# Patient Record
Sex: FEMALE | Race: OTHER | Employment: FULL TIME | URBAN - METROPOLITAN AREA
[De-identification: names, ages, dates, MRNs, and addresses within clinical notes are randomized per-mention and may not be internally consistent; named-entity substitution may affect disease eponyms.]

---

## 2017-01-31 ENCOUNTER — TELEPHONE (OUTPATIENT)
Dept: INTERNAL MEDICINE CLINIC | Age: 52
End: 2017-01-31

## 2017-01-31 ENCOUNTER — OFFICE VISIT (OUTPATIENT)
Dept: INTERNAL MEDICINE CLINIC | Age: 52
End: 2017-01-31

## 2017-01-31 VITALS
HEIGHT: 60 IN | SYSTOLIC BLOOD PRESSURE: 108 MMHG | OXYGEN SATURATION: 99 % | DIASTOLIC BLOOD PRESSURE: 74 MMHG | HEART RATE: 64 BPM | RESPIRATION RATE: 19 BRPM | BODY MASS INDEX: 30.27 KG/M2 | WEIGHT: 154.2 LBS | TEMPERATURE: 98.2 F

## 2017-01-31 DIAGNOSIS — N95.1 MENOPAUSAL SYMPTOMS: ICD-10-CM

## 2017-01-31 DIAGNOSIS — E11.9 NEW ONSET TYPE 2 DIABETES MELLITUS (HCC): Primary | ICD-10-CM

## 2017-01-31 DIAGNOSIS — N89.8 VAGINA ITCHING: ICD-10-CM

## 2017-01-31 DIAGNOSIS — N92.6 IRREGULAR MENSES: ICD-10-CM

## 2017-01-31 RX ORDER — FLUCONAZOLE 150 MG/1
150 TABLET ORAL
Qty: 2 TAB | Refills: 0 | Status: SHIPPED | OUTPATIENT
Start: 2017-01-31 | End: 2017-02-08

## 2017-01-31 NOTE — MR AVS SNAPSHOT
Visit Information Beck Roberts Personal Médico Departamento Teléfono del Dep. Número de visita 1/31/2017 10:15 AM Doug Oh MD [facility] Sports [facility] and [address] [phone] Follow-up Instructions Return in about 3 weeks (around 2/24/2017), or if symptoms worsen or fail to improve, for Diabetes, , Review Lab Results. Follow-up and Disposition History Upcoming Health Maintenance Date Due HEMOGLOBIN A1C Q6M 1966 FOOT EXAM Q1 1/19/1976 MICROALBUMIN Q1 1/19/1976 EYE EXAM RETINAL OR DILATED Q1 1/19/1976 Pneumococcal 19-64 Medium Risk (1 of 1 - PPSV23) 1/19/1985 DTaP/Tdap/Td series (1 - Tdap) 1/19/1987 FOBT Q 1 YEAR AGE 50-75 1/19/2016 INFLUENZA AGE 9 TO ADULT 8/1/2016 LIPID PANEL Q1 9/14/2016 BREAST CANCER SCRN MAMMOGRAM 10/21/2017 PAP AKA CERVICAL CYTOLOGY 9/14/2018 Alergias  Review Complete El: 1/31/2017 Por: [place] A partir del:  1/31/2017 No Known Allergies Vacunas actuales Coit Hirschfeld No hay ninguna vacuna archivada. No revisadas esta visita You Were Diagnosed With   
  
 Mammie Romberg New onset type 2 diabetes mellitus ([facility] [address]    -  Primary ICD-10-CM: E11.9 ICD-9-CM: 250.00 Menopausal symptoms     ICD-10-CM: N95.1 ICD-9-CM: 627.2 Irregular menses     ICD-10-CM: N92.6 ICD-9-CM: 626.4 Vagina itching     ICD-10-CM: L29.8 ICD-9-CM: 698.1 Partes vitales PS Pulso Temperatura Resp [place] ( percentil de crecimiento) Peso (percentil de crecimiento) 108/74 (BP 1 Location: Left arm, BP Patient Position: Sitting) 64 98.2 °F (36.8 °C) (Oral) 19 5' 0.3\" (1.532 m) 154 lb 3.2 oz (69.9 kg) SpO2 BMI (IMC) Estado obstétrico Estatus de tabaquísmo 99% 29.82 kg/m2 Having regular periods Never Smoker BMI and BSA Data Body Mass Index Body Surface Area  
 29.82 kg/m 2 1.72 m 2 [facility] Name Phone Rebecca Kumar [address] [phone] Brown lista de medicamentos actualizada Lista actualizada el: 17 11:36 AM.  Radha Engsam use brown lista de medicamentos más reciente. cyclobenzaprine 5 mg tablet También conocido roselyn:  FLEXERIL Take 1-2 tablets at night for back pain. Muscle relaxer  
  
 diclofenac EC 75 mg EC tablet También conocido roselyn:  VOLTAREN  
75 mg. Diphth, Pertus(Acell), Tetanus 2.5-8-5 Lf-mcg-Lf/0.5mL Syrg También conocido roselyn:  BOOSTRIX TDAP  
0.5 mL by IntraMUSCular route once for 1 dose. fluconazole 150 mg tablet También conocido roselyn:  DIFLUCAN Take 1 Tab by mouth every seven (7) days for 2 doses. FDA advises cautious prescribing of oral fluconazole in pregnancy. metFORMIN 500 mg tablet También conocido roselyn:  GLUCOPHAGE Take  by mouth two (2) times daily (with meals). PARoxetine 40 mg tablet También conocido roselyn:  PAXIL Take 1 Tab by mouth daily. pneumococcal 13 jimmy conj dip 0.5 mL Syrg injection También conocido roselyn:  PREVNAR-13  
0.5 mL by IntraMUSCular route once for 1 dose. Recetas Enviado a la [place] Refills  
 pneumococcal 13 jimmy conj dip (PREVNAR-13) 0.5 mL syrg injection 0 Si.5 mL by IntraMUSCular route once for 1 dose. Class: Normal  
 Pharmacy: Rebecca Kumar [place] [id number] [facility] Ph #: [phone] Route: IntraMUSCular Dewanda Lent,, Tetanus (BOOSTRIX TDAP) 2.5-8-5 Lf-mcg-Lf/0.5mL syrg 0 Si.5 mL by IntraMUSCular route once for 1 dose. Class: Normal  
 Pharmacy: Rebecca Kumar [facility] [address] [facility] Ph #: [phone] Route: IntraMUSCular  
 fluconazole (DIFLUCAN) 150 mg tablet 0 Sig: Take 1 Tab by mouth every seven (7) days for 2 doses. FDA advises cautious prescribing of oral fluconazole in pregnancy.   
 Class: Normal  
 Pharmacy: Rachid Crane [address], [address] [employer]  #: [phone] Route: Oral  
  
Hicimos lo siguiente HEMOGLOBIN A1C WITH EAG [26885 CPT(R)]  DIABETES FOOT EXAM [HM7 Custom] LIPID PANEL [68167 CPT(R)] MICROALBUMIN, UR, RAND W/ MICROALBUMIN/CREA RATIO [id number] CPT(R)] OCCULT BLOOD, IMMUNOASSAY (FIT) [id number] CPT(R)] REFERRAL TO OBSTETRICS AND GYNECOLOGY [REF51 Custom] Comentarios:  
 Please evaluate patient for endometrial biopsy- no menses x 6 months, then restarted. REFERRAL TO OPHTHALMOLOGY [REF57 Custom] Instrucciones de seguimiento Return in about 3 weeks (around 2/24/2017), or if symptoms worsen or fail to improve, for Diabetes, , Review Lab Results. Informacion de [employer] Codigo de Referencia Referido por Referido a  
  
 [id number] JOVANNI HARVEY No disponible Visitas [place] [facility] final  
 1 New Request 1/31/17 1/31/18 Si brown referencia tiene un estado de \"pending review\" o \"denied\" , informacion adicional sera enviada para apoyar el resultado de esta decision. Codigo de Referencia Referido por Referido a  
 [id number] [place], [address] [facility], [address] [facility], [address] Phone: [phone] Fax: [phone] [facility]  
 1 New Request 1/31/17 1/31/18 Si brown referencia tiene un estado de \"pending review\" o \"denied\" , informacion adicional sera enviada para apoyar el resultado de esta decision. Introducing [facility] SERVICES! [facility] introduce portal paciente MyChart . Ahora se puede acceder a partes de brown expediente médico, enviar por correo electrónico la oficina de brown médico y solicitar renovaciones de medicamentos en línea. En brown navegador de Internet , Margaret arias [email or web address] Paramjit clic en el usuario por Chay Esqueda?  Mannie Short clic aquí en la Adela Lay. Verá la página de registro [place]. Ingrese brown código de [employer] kirk y roselyn aparece a continuación. Usted no tendrá que [employer] código después de avis completado el proceso de registro . Si usted no se inscribe antes de la fecha de caducidad , debe solicitar un nuevo código. · MyChart Código de acceso : [id number] Expires: 5/1/2017 11:36 AM 
 
Ingresa los últimos cuatro dígitos de brown Número de Seguro Social ( xxxx ) y fecha de nacimiento ( dd / mm / aaaa ) roselyn se indica y paramjit clic en Enviar. Usted será llevado a la siguiente página de registro . Crear un ID MyChart . Esta será brown ID de inicio de sesión de MyChart y no puede ser [place] , por lo que pensar en charles que es [place] y fácil de recordar . Crear charles contraseña MyChart . Usted puede cambiar brown contraseña en cualquier momento . Ingrese brown Password Reset de preguntas y [place] . Bode se puede utilizar en un momento posterior si usted olvida brown contraseña. Introduzca brown dirección de correo electrónico . Padmini Puente recibirá charles notificación por correo electrónico cuando la nueva información está disponible en MyChart . Beau Gayer clic en Registrarse. Karthik Mendoza anita y descargar porciones de brown expediente médico. 
Paramjit clic en el enlace de descarga del menú Resumen para descargar charles copia portátil de brown información médica . Si tiene [employer] , por favor visite la sección de preguntas frecuentes del sitio web MyChart . Recuerde, MyChart NO es que se utilizará para las necesidades urgentes. Para emergencias médicas , llame al 911 . Ahora disponible en brown iPhone y Android ! Por favor proporcione trey resumen de la documentación de cuidado a brown próximo proveedor. Your primary care clinician is listed as Manpreet Pi. If you have any questions after today's visit, please call [phone].

## 2017-01-31 NOTE — PROGRESS NOTES
1. Have you been to the ER, urgent care clinic since your last visit? Hospitalized since your last visit? No    2. Have you seen or consulted any other health care providers outside of the [address] [place] [address] since your last visit? Include any pap smears or colon screening.  No

## 2017-01-31 NOTE — PROGRESS NOTES
Ms. Claire Valdovinos is a 47y.o. year old female who had concerns including Vaginal Itching. HPI:  Chief Complaint   Patient presents with    Vaginal Itching     for 1 week , vaginal bleeding, menses after no menses x 6 months. No results found for: HBA1C, HGBE8, KCB8IYZZ, GHW2LGOV, NGG6EQWY    Pt does not check blood sugars. Wt Readings from Last 3 Encounters:   01/31/17 154 lb 3.2 oz (69.9 kg)   10/12/16 155 lb 4.8 oz (70.4 kg)   09/28/15 159 lb 14.4 oz (72.5 kg)       Past Medical History   Diagnosis Date    H/O mammogram 2012     normal    Pap smear for cervical cancer screening 4/2013     normal     Current Outpatient Prescriptions   Medication Sig Dispense    pneumococcal 13 jimmy conj dip (PREVNAR-13) 0.5 mL syrg injection 0.5 mL by IntraMUSCular route once for 1 dose. 0.5 mL    Diphth, Pertus,Acell,, Tetanus (BOOSTRIX TDAP) 2.5-8-5 Lf-mcg-Lf/0.5mL syrg 0.5 mL by IntraMUSCular route once for 1 dose. 0.5 mL    fluconazole (DIFLUCAN) 150 mg tablet Take 1 Tab by mouth every seven (7) days for 2 doses. FDA advises cautious prescribing of oral fluconazole in pregnancy. 2 Tab    metFORMIN (GLUCOPHAGE) 500 mg tablet Take  by mouth two (2) times daily (with meals).  PARoxetine (PAXIL) 40 mg tablet Take 1 Tab by mouth daily. 60 Tab    diclofenac EC (VOLTAREN) 75 mg EC tablet 75 mg.  cyclobenzaprine (FLEXERIL) 5 mg tablet Take 1-2 tablets at night for back pain. Muscle relaxer 15 Tab     No current facility-administered medications for this visit. Reviewed PmHx, RxHx, FmHx, SocHx, AllgHx and updated and dated in the chart.     ROS: Negative except for BOLD  General: fever, chills, fatigue  Respiratory: cough, SOB, wheezing  Cardiovascular:  CP, palpitation, CANTU, edema   Gastrointestinal: N/V/D, bleeding  Genito-Urinary: dysuria, hematuria  Musculoskeletal: muscle weakness, pain, swelling    OBJECTIVE:   Visit Vitals    /74 (BP 1 Location: Left arm, BP Patient Position: Sitting)    Pulse 64    Temp 98.2 °F (36.8 °C) (Oral)    Resp 19    Ht 5' 0.3\" (1.532 m)    Wt 154 lb 3.2 oz (69.9 kg)    SpO2 99%    BMI 29.82 kg/m2     GEN: The patient appears well, alert, oriented x 3, in no distress. ENT: bilateral TM and canal normal.  Neck supple. No adenopathy or thyromegaly. JUAN. Lungs: clear bilaterally, good air entry, no wheezes, rhonchi or rales. Cardiovascular: regular rate and rhythm. S1 and S2 normal, no murmurs,  Abdomen: + BS, soft without tenderness, guarding, rebound, mass or organomegaly. Extremities: no edema, normal peripheral pulses. Neurological: normal, gross sensory and motor in tact without focal findings. Pelvic exam: CERVIX: cervical discharge present - bloody. Erythema on labia majora. No CMTor enlarge uterus appeciated. Assessment/ Plan:       ICD-10-CM ICD-9-CM    1. [facility] E11.9 250.00 HEMOGLOBIN A1C WITH EAG      MICROALBUMIN, UR, RAND W/ MICROALBUMIN/CREA RATIO      REFERRAL TO OPHTHALMOLOGY      pneumococcal 13 jimmy conj dip (PREVNAR-13) 0.5 mL syrg injection      Diphth, Pertus,Acell,, Tetanus (BOOSTRIX TDAP) 2.5-8-5 Lf-mcg-Lf/0.5mL syrg      OCCULT BLOOD, IMMUNOASSAY (FIT)      LIPID PANEL      HM DIABETES FOOT EXAM   2. Menopausal symptoms N95.1 627.2    3. Irregular menses N92.6 626.4 REFERRAL TO OBSTETRICS AND GYNECOLOGY   4. Vagina itching L29.8 698.1 fluconazole (DIFLUCAN) 150 mg tablet     Pt needs to be retaught to check BS at home. I have discussed the diagnosis with the patient and the intended plan as seen in the above orders. The patient has received an after-visit summary and questions were answered concerning future plans. Medication Side Effects and Warnings were discussed with patient. Follow-up Disposition:  Return in about 3 weeks (around 2/24/2017), or if symptoms worsen or fail to improve, for Diabetes, , Review Lab Results.       Nicole Estevez MD

## 2017-02-01 ENCOUNTER — TELEPHONE (OUTPATIENT)
Dept: INTERNAL MEDICINE CLINIC | Age: 52
End: 2017-02-01

## 2017-02-01 NOTE — TELEPHONE ENCOUNTER
----- Message from Lyn Sánchez sent at 2/1/2017  3:13 PM EST -----  Regarding: Dr. Jamal Smith  Patient's daughter called for second time to clarify what type of lab the patient is supposed to submit. Patient was given a fecal kit to take home but thought she was supposed to submit a urine sample. Patient needs to be advised on the purpose of the lab that is being submitted. Best contact number is [phone]. Please speak with daughter, Kim Alvarez.

## 2017-02-02 ENCOUNTER — TELEPHONE (OUTPATIENT)
Dept: INTERNAL MEDICINE CLINIC | Age: 52
End: 2017-02-02

## 2017-02-02 LAB
ALBUMIN/CREAT UR: 35 MG/G CREAT (ref 0–30)
CHOLEST SERPL-MCNC: 177 MG/DL (ref 100–199)
CREAT UR-MCNC: 174.1 MG/DL
EST. AVERAGE GLUCOSE BLD GHB EST-MCNC: 126 MG/DL
HBA1C MFR BLD: 6 % (ref 4.8–5.6)
HDLC SERPL-MCNC: 62 MG/DL
HEMOCCULT STL QL IA: NORMAL
LDLC SERPL CALC-MCNC: 103 MG/DL (ref 0–99)
MICROALBUMIN UR-MCNC: 60.9 UG/ML
TRIGL SERPL-MCNC: 61 MG/DL (ref 0–149)
VLDLC SERPL CALC-MCNC: 12 MG/DL (ref 5–40)

## 2017-02-02 NOTE — TELEPHONE ENCOUNTER
Returned pt daughter call, pt is on release of info form and verified pt , in regards to kit that pt was given. Confirmed with daughter that kit is for stool sample to test risk for colorectal cancer by looking for hidden blood. Pt also asked why they had a referral to opthamology, viewed referral and informed her since her mother has dm it is important to have the eyes checked frequently, she verbalized understanding, no other questions at this time.

## 2017-02-09 ENCOUNTER — TELEPHONE (OUTPATIENT)
Dept: INTERNAL MEDICINE CLINIC | Age: 52
End: 2017-02-09

## 2017-02-09 DIAGNOSIS — N89.8 VAGINAL PRURITUS: Primary | ICD-10-CM

## 2017-02-09 NOTE — TELEPHONE ENCOUNTER
Pt came to office stating the 2 diflucan tablets she was given didn't help her itching. She stated  told her she would be gone but to come back if it didn't work and another would write something. Spoke with dr Megan Osei and we can refer to ob/gyn.  Pt notified

## 2017-02-14 LAB — HEMOCCULT STL QL IA: NEGATIVE

## 2017-02-27 ENCOUNTER — TELEPHONE (OUTPATIENT)
Dept: INTERNAL MEDICINE CLINIC | Age: 52
End: 2017-02-27

## 2017-02-27 ENCOUNTER — OFFICE VISIT (OUTPATIENT)
Dept: INTERNAL MEDICINE CLINIC | Age: 52
End: 2017-02-27

## 2017-02-27 VITALS
HEIGHT: 60 IN | OXYGEN SATURATION: 98 % | RESPIRATION RATE: 14 BRPM | TEMPERATURE: 98.8 F | BODY MASS INDEX: 30.23 KG/M2 | SYSTOLIC BLOOD PRESSURE: 111 MMHG | WEIGHT: 154 LBS | HEART RATE: 68 BPM | DIASTOLIC BLOOD PRESSURE: 61 MMHG

## 2017-02-27 DIAGNOSIS — R73.03 PREDIABETES: Primary | ICD-10-CM

## 2017-02-27 DIAGNOSIS — E11.9 NEW ONSET TYPE 2 DIABETES MELLITUS (HCC): ICD-10-CM

## 2017-02-27 RX ORDER — METFORMIN HYDROCHLORIDE 500 MG/1
500 TABLET ORAL
Qty: 90 TAB | Refills: 4 | Status: SHIPPED | OUTPATIENT
Start: 2017-02-27 | End: 2017-08-02 | Stop reason: SDUPTHER

## 2017-02-27 NOTE — TELEPHONE ENCOUNTER
----- Message from Francisco Murphy MD sent at 2/22/2017  3:06 PM EST -----  Stool is negative for blood.    Francisco Murphy MD

## 2017-02-27 NOTE — PATIENT INSTRUCTIONS
Prediabetes: Care Instructions  Your Care Instructions  Prediabetes is a warning sign that you are at risk for getting type 2 diabetes. It means that your blood sugar is higher than it should be. The food you eat turns into sugar, which your body uses for energy. Normally, an organ called the pancreas makes insulin, which allows the sugar in your blood to get into your body's cells. But when your body can't use insulin the right way, the sugar doesn't move into cells. It stays in your blood instead. This is called insulin resistance. The buildup of sugar in the blood causes prediabetes. The good news is that lifestyle changes may help you get your blood sugar back to normal and help you avoid or delay diabetes. Follow-up care is a key part of your treatment and safety. Be sure to make and go to all appointments, and call your doctor if you are having problems. It's also a good idea to know your test results and keep a list of the medicines you take. How can you care for yourself at home? · Watch your weight. A healthy weight helps your body use insulin properly. · Limit the amount of calories, sweets, and unhealthy fat you eat. Ask your doctor if you should see a dietitian. A registered dietitian can help you create meal plans that fit your lifestyle. · Get at least 30 minutes of exercise on most days of the week. Exercise helps control your blood sugar. It also helps you maintain a healthy weight. Walking is a good choice. You also may want to do other activities, such as running, swimming, cycling, or playing tennis or team sports. · Do not smoke. Smoking can make prediabetes worse. If you need help quitting, talk to your doctor about stop-smoking programs and medicines. These can increase your chances of quitting for good. · If your doctor prescribed medicines, take them exactly as prescribed. Call your doctor if you think you are having a problem with your medicine.  You will get more details on the specific medicines your doctor prescribes. When should you call for help? Watch closely for changes in your health, and be sure to contact your doctor if:  · You have any symptoms of diabetes. These may include:  ¨ Being thirsty more often. ¨ Urinating more. ¨ Being hungrier. ¨ Losing weight. ¨ Being very tired. ¨ Having blurry vision. · You have a wound that will not heal.  · You have an infection that will not go away. · You have problems with your blood pressure. · You want more information about diabetes and how you can keep from getting it. Where can you learn more? Go to http://trip-joceline.info/. Enter I222 in the search box to learn more about \"Prediabetes: Care Instructions. \"  Current as of: May 23, 2016  Content Version: 11.1  © 5534-1723 [employer]. Care instructions adapted under license by [employer] (which disclaims liability or warranty for this information). If you have questions about a medical condition or this instruction, always ask your healthcare professional. [address] any warranty or liability for your use of this information.

## 2017-02-27 NOTE — LETTER
NOTIFICATION RETURN TO WORK / SCHOOL 
 
2/27/2017 11:09 AM 
 
Ms. Karen Loredo [address] [facility] [address] To Whom It May Concern: 
 
Karen Loredo is currently under the care of Talia Carrington. She will return to work/school on: 02/28/2017 Patient was seen in office today 02/27/2017 If there are questions or concerns please have the patient contact our office.  
 
 
 
Sincerely, 
 
 
Nicole Estevez MD

## 2017-02-27 NOTE — PROGRESS NOTES
Burton Hansen is a 47 y.o. female  Chief Complaint   Patient presents with    Diabetes     follow up    Medication Evaluation     not taking any medications as prescribed     1. Have you been to the ER, urgent care clinic since your last visit? Hospitalized since your last visit? No    2. Have you seen or consulted any other health care providers outside of the [address] [place] [address] since your last visit? Include any pap smears or colon screening.  No

## 2017-02-27 NOTE — PROGRESS NOTES
Ms. Sam Banuelos is a 47y.o. year old female who had concerns including Diabetes and Medication Evaluation. HPI:  Chief Complaint   Patient presents with    Diabetes     follow up    Medication Evaluation     not taking any medications as prescribed   she wants to see her levels. Recent dx of prediabetes and taking metformin well tolerated. Body mass index is 30.08 kg/(m^2). Past Medical History:   Diagnosis Date    Degenerative arthritis of knee, bilateral     seen by ortho and injection    H/O mammogram 2012    normal    Pap smear for cervical cancer screening 4/2013    normal     Current Outpatient Prescriptions   Medication Sig Dispense    metFORMIN (GLUCOPHAGE) 500 mg tablet Take 1 Tab by mouth daily (with breakfast). 90 Tab    PARoxetine (PAXIL) 40 mg tablet Take 1 Tab by mouth daily. 60 Tab    diclofenac EC (VOLTAREN) 75 mg EC tablet 75 mg.  cyclobenzaprine (FLEXERIL) 5 mg tablet Take 1-2 tablets at night for back pain. Muscle relaxer 15 Tab     No current facility-administered medications for this visit. Reviewed PmHx, RxHx, FmHx, SocHx, AllgHx and updated and dated in the chart. ROS: Negative except for BOLD  General: fever, chills, fatigue  Respiratory: cough, SOB, wheezing  Cardiovascular:  CP, palpitation, CANTU, edema   Gastrointestinal: N/V/D, bleeding  Genito-Urinary: dysuria, hematuria  Musculoskeletal: muscle weakness, pain, swelling    OBJECTIVE:   Visit Vitals    /61 (BP 1 Location: Left arm, BP Patient Position: Sitting)    Pulse 68    Temp 98.8 °F (37.1 °C) (Oral)    Resp 14    Ht 5' (1.524 m)    Wt 154 lb (69.9 kg)    LMP  (LMP Unknown)    SpO2 98%    BMI 30.08 kg/m2     GEN: The patient appears well, alert, oriented x 3, in no distress. ENT: bilateral TM and canal normal.  Neck supple. No adenopathy or thyromegaly. JUAN. Lungs: clear bilaterally, good air entry, no wheezes, rhonchi or rales. Cardiovascular: regular rate and rhythm.  S1 and S2 normal, no murmurs,  Abdomen: + BS, soft without tenderness, guarding, rebound, mass or organomegaly. Extremities: no edema, normal peripheral pulses. Neurological: normal, gross sensory and motor in tact without focal findings. Assessment/ Plan:       ICD-10-CM ICD-9-CM    1. Prediabetes R73.03 790.29 metFORMIN (GLUCOPHAGE) 500 mg tablet   2. New onset type 2 diabetes mellitus (HCC) E11.9 250.00    3. BMI 30.0-30.9,adult Z68.30 V85.30      Pt tolerating medication, wants to take it daily and see if maintains levels. Discussed signs and sx of worsening disease  Weight loss, start 10 pounds      I have discussed the diagnosis with the patient and the intended plan as seen in the above orders. The patient has received an after-visit summary and questions were answered concerning future plans. Medication Side Effects and Warnings were discussed with patient. Follow-up Disposition:  Return in about 3 months (around 5/27/2017) for Diabetes, Weight loss progress.       Manpreet Talbert MD

## 2017-02-27 NOTE — MR AVS SNAPSHOT
Visit Information Beck Fidejessica Patrick Personal Médico Departamento Teléfono del Dep. Número de visita 2/27/2017  9:30 AM Doug Oh MD [facility] [phone] Follow-up Instructions Return in about 3 months (around 5/27/2017) for Diabetes, Weight loss progress. Your Appointments 3/16/2017  1:00 PM  
New Patient with Zoë Jama MD  
[facility] ([facility]) Appt Note: irregular cycles [place] [address] [id number]  
[phone]  
  
   
 [place] [address] Upcoming Health Maintenance Date Due  
 EYE EXAM RETINAL OR DILATED Q1 1/19/1976 Pneumococcal 19-64 Medium Risk (1 of 1 - PPSV23) 1/19/1985 HEMOGLOBIN A1C Q6M 7/31/2017 BREAST CANCER SCRN MAMMOGRAM 10/21/2017 FOOT EXAM Q1 1/31/2018 MICROALBUMIN Q1 1/31/2018 LIPID PANEL Q1 1/31/2018 FOBT Q 1 YEAR AGE 50-75 2/7/2018 PAP AKA CERVICAL CYTOLOGY 9/14/2018 DTaP/Tdap/Td series (2 - Td) 2/27/2027 Alergias  Review Complete El: 2/27/2017 Por: Oleg Soto LPN A partir del:  2/27/2017 No Known Allergies Vacunas actuales Coit [place] No hay ninguna vacuna archivada. No revisadas esta visita Partes vitales PS  
  
  
  
  
  
 111/61 (BP 1 Location: Left arm, BP Patient Position: Sitting) Historial de signos vitales BMI and BSA Data Body Mass Index Body Surface Area 30.08 kg/m 2 1.72 m 2 [facility] Name Phone Lorrie Levin [address] [facility], [address] [place] [address] [phone] Kaplan lista de medicamentos actualizada Lista actualizada el: 2/27/17 10:56 AM.  Renato Valerio use kaplan lista de medicamentos más reciente. cyclobenzaprine 5 mg tablet También conocido roselyn:  FLEXERIL Take 1-2 tablets at night for back pain. Muscle relaxer diclofenac EC 75 mg EC tablet También conocido roselyn:  VOLTAREN  
75 mg.  
  
 metFORMIN 500 mg tablet También conocido roselyn:  GLUCOPHAGE Take  by mouth two (2) times daily (with meals). PARoxetine 40 mg tablet También conocido roselyn:  PAXIL Take 1 Tab by mouth daily. Instrucciones de seguimiento Return in about 3 months (around 5/27/2017) for Diabetes, Weight loss progress. Instrucciones para el Paciente Prediabetes: Care Instructions Your Care Instructions Prediabetes is a warning sign that you are at risk for getting type 2 diabetes. It means that your blood sugar is higher than it should be. The food you eat turns into sugar, which your body uses for energy. Normally, an organ called the pancreas makes insulin, which allows the sugar in your blood to get into your body's cells. But when your body can't use insulin the right way, the sugar doesn't move into cells. It stays in your blood instead. This is called insulin resistance. The buildup of sugar in the blood causes prediabetes. The good news is that lifestyle changes may help you get your blood sugar back to normal and help you avoid or delay diabetes. Follow-up care is a key part of your treatment and safety. Be sure to make and go to all appointments, and call your doctor if you are having problems. It's also a good idea to know your test results and keep a list of the medicines you take. How can you care for yourself at home? · Watch your weight. A healthy weight helps your body use insulin properly. · Limit the amount of calories, sweets, and unhealthy fat you eat. Ask your doctor if you should see a dietitian. A registered dietitian can help you create meal plans that fit your lifestyle. · Get at least 30 minutes of exercise on most days of the week. Exercise helps control your blood sugar. It also helps you maintain a healthy weight. Walking is a good choice.  You also may want to do other activities, such as running, swimming, cycling, or playing tennis or team sports. · Do not smoke. Smoking can make prediabetes worse. If you need help quitting, talk to your doctor about stop-smoking programs and medicines. These can increase your chances of quitting for good. · If your doctor prescribed medicines, take them exactly as prescribed. Call your doctor if you think you are having a problem with your medicine. You will get more details on the specific medicines your doctor prescribes. When should you call for help? Watch closely for changes in your health, and be sure to contact your doctor if: 
· You have any symptoms of diabetes. These may include: ¨ Being thirsty more often. ¨ Urinating more. ¨ Being hungrier. ¨ Losing weight. ¨ Being very tired. ¨ Having blurry vision. · You have a wound that will not heal. 
· You have an infection that will not go away. · You have problems with your blood pressure. · You want more information about diabetes and how you can keep from getting it. Where can you learn more? Go to http://trip-joceline.info/. Enter I222 in the search box to learn more about \"Prediabetes: Care Instructions. \" Current as of: May 23, 2016 Content Version: 11.1 © 6686-1071 [employer]. Care instructions adapted under license by [employer] (which disclaims liability or warranty for this information). If you have questions about a medical condition or this instruction, always ask your healthcare professional. [address] any warranty or liability for your use of this information. Introducing [facility] & HEALTH SERVICES! [facility] introduce portal paciente Juan Jose . Ahora se puede acceder a partes de brown expediente médico, enviar por correo electrónico la oficina de brown médico y solicitar renovaciones de medicamentos en línea. En brown navegador de Internet , Lonniealee Lyndsay a [email or web address] Kid Care Years. com/Smile Familyt Marixa clic en el usuario por Kelsey [place]? Trude Patches clic aquí en la sesión Jordis [place]. Verá la página de registro [place]. Ingrese brown código de [employer] Nirmala kirk y roselyn aparece a continuación. Usted no tendrá que [employer] código después de avis completado el proceso de registro . Si usted no se inscribe antes de la fecha de caducidad , debe solicitar un nuevo código. · MyChart Código de acceso : [id number] Expires: 5/1/2017 11:36 AM 
 
Ingresa los últimos cuatro dígitos de brown Número de Seguro Social ( xxxx ) y fecha de nacimiento ( dd / mm / aaaa ) roselyn se indica y marixa clic en Enviar. Usted será llevado a la siguiente página de registro . Crear un ID MyChart . Esta será brown ID de inicio de sesión de MyChart y no puede ser [place] , por lo que pensar en charles que es Leah Lambert y fácil de recordar . Crear charles contraseña MyChart . Usted puede cambiar brown contraseña en cualquier momento . Ingrese brown Password Reset de preguntas y Mcgee . [place] se puede utilizar en un momento posterior si usted olvida brown contraseña. Introduzca brown dirección de correo electrónico . [place] Cav[place]os recibirá charles notificación por correo electrónico cuando la nueva información está disponible en MyChart . Darliss Mins clic en Registrarse. Yee Cabrera anita y descargar porciones de brown expediente médico. 
Marixa clic en el enlace de descarga del menú Resumen para descargar charles copia portátil de brown información médica . Si tiene [employer] , por favor visite la sección de preguntas frecuentes del sitio web MyChart . Recuerde, MyChart NO es que se utilizará para las necesidades urgentes. Para emergencias médicas , llame al 911 . Ahora disponible en brown iPhone y Android ! Por favor proporcione trey resumen de la documentación de cuidado a brown próximo proveedor. Your primary care clinician is listed as [employer] If you have any questions after today's visit, please call [phone].

## 2017-08-02 ENCOUNTER — OFFICE VISIT (OUTPATIENT)
Dept: INTERNAL MEDICINE CLINIC | Age: 52
End: 2017-08-02

## 2017-08-02 VITALS
DIASTOLIC BLOOD PRESSURE: 80 MMHG | BODY MASS INDEX: 30.18 KG/M2 | TEMPERATURE: 98.7 F | SYSTOLIC BLOOD PRESSURE: 133 MMHG | OXYGEN SATURATION: 96 % | RESPIRATION RATE: 16 BRPM | HEIGHT: 60 IN | WEIGHT: 153.7 LBS | HEART RATE: 77 BPM

## 2017-08-02 DIAGNOSIS — H57.11 ACUTE RIGHT EYE PAIN: ICD-10-CM

## 2017-08-02 DIAGNOSIS — E11.9 NEW ONSET TYPE 2 DIABETES MELLITUS (HCC): ICD-10-CM

## 2017-08-02 DIAGNOSIS — R73.03 PREDIABETES: Primary | ICD-10-CM

## 2017-08-02 DIAGNOSIS — R68.89 LIGHT SENSITIVITY: ICD-10-CM

## 2017-08-02 DIAGNOSIS — Z12.39 SCREENING FOR BREAST CANCER: ICD-10-CM

## 2017-08-02 DIAGNOSIS — N95.1 HOT FLASHES DUE TO MENOPAUSE: ICD-10-CM

## 2017-08-02 LAB — GLUCOSE POC: 121 MG/DL

## 2017-08-02 RX ORDER — METFORMIN HYDROCHLORIDE 500 MG/1
500 TABLET ORAL
Qty: 90 TAB | Refills: 4 | Status: SHIPPED | OUTPATIENT
Start: 2017-08-02

## 2017-08-02 RX ORDER — NORETHINDRONE ACETATE AND ETHINYL ESTRADIOL 1MG-20(21)
1 KIT ORAL DAILY
Qty: 90 TAB | Refills: 4 | Status: SHIPPED | OUTPATIENT
Start: 2017-08-02 | End: 2017-08-29 | Stop reason: SDUPTHER

## 2017-08-02 RX ORDER — POLYMYXIN B SULFATE AND TRIMETHOPRIM 1; 10000 MG/ML; [USP'U]/ML
1 SOLUTION OPHTHALMIC EVERY 4 HOURS
Qty: 1 BOTTLE | Refills: 0 | Status: SHIPPED | OUTPATIENT
Start: 2017-08-02 | End: 2017-08-12

## 2017-08-02 NOTE — PROGRESS NOTES
Ms. Marivel Narayan is a 47y.o. year old female who had concerns including Eye Problem; Other; and Medication Refill. HPI:  Chief Complaint   Patient presents with    Eye Problem: right eye, 'something in eye'  Not relieved with eye drops,     Other     Hot Flashes-Paxil not working, pt not taking medication    Medication Refill: metformin       this AM, POC       Wt Readings from Last 3 Encounters:   08/02/17 153 lb 11.2 oz (69.7 kg)   02/27/17 154 lb (69.9 kg)   01/31/17 154 lb 3.2 oz (69.9 kg)       Lab Results   Component Value Date/Time    Hemoglobin A1c 6.0 01/31/2017 11:26 AM       Past Medical History:   Diagnosis Date    Degenerative arthritis of knee, bilateral     seen by ortho and injection    H/O mammogram 2012    normal    Pap smear for cervical cancer screening 4/2013    normal     Current Outpatient Prescriptions   Medication Sig Dispense    metFORMIN (GLUCOPHAGE) 500 mg tablet Take 1 Tab by mouth daily (with breakfast). 90 Tab    trimethoprim-polymyxin b (POLYTRIM) ophthalmic solution Administer 1 Drop to both eyes every four (4) hours for 10 days. 1 Bottle    norethindrone-ethinyl estradiol (JUNEL FE 1/20) 1 mg-20 mcg (21)/75 mg (7) tab Take 1 Tab by mouth daily. Do not take inactive pills  Indications: hot flashes 90 Tab    PARoxetine (PAXIL) 40 mg tablet Take 1 Tab by mouth daily. 60 Tab    diclofenac EC (VOLTAREN) 75 mg EC tablet 75 mg.  cyclobenzaprine (FLEXERIL) 5 mg tablet Take 1-2 tablets at night for back pain. Muscle relaxer 15 Tab     No current facility-administered medications for this visit. Reviewed PmHx, RxHx, FmHx, SocHx, AllgHx and updated and dated in the chart.     ROS: Negative except for BOLD  General: fever, chills, fatigue  Respiratory: cough, SOB, wheezing  Cardiovascular:  CP, palpitation, CANTU, edema   Gastrointestinal: N/V/D, bleeding  Genito-Urinary: dysuria, hematuria  Musculoskeletal: muscle weakness, pain, swelling    OBJECTIVE:   Visit Vitals    /80 (BP 1 Location: Left arm, BP Patient Position: Sitting)    Pulse 77    Temp 98.7 °F (37.1 °C) (Oral)    Resp 16    Ht 5' (1.524 m)    Wt 153 lb 11.2 oz (69.7 kg)    LMP 05/02/2017    SpO2 96%    BMI 30.02 kg/m2     GEN: The patient appears well, alert, oriented x 3, in no distress. ENT: bilateral TM and canal normal.  Neck supple. No adenopathy or thyromegaly. JUAN. Lungs: clear bilaterally, good air entry, no wheezes, rhonchi or rales. Cardiovascular: regular rate and rhythm. S1 and S2 normal, no murmurs,  Abdomen: + BS, soft without tenderness, guarding, rebound, mass or organomegaly. Extremities: no edema, normal peripheral pulses. Neurological: normal, gross sensory and motor in tact without focal findings. Assessment/ Plan:       ICD-10-CM ICD-9-CM    1. Prediabetes R73.03 790.29 metFORMIN (GLUCOPHAGE) 500 mg tablet      HEMOGLOBIN A1C WITH EAG      METABOLIC PANEL, BASIC   2. New onset type 2 diabetes mellitus (HCC) E11.9 250.00 metFORMIN (GLUCOPHAGE) 500 mg tablet      AMB POC GLUCOSE BLOOD, BY GLUCOSE MONITORING DEVICE      HEMOGLOBIN A1C WITH EAG      METABOLIC PANEL, BASIC      [facility] MAMMO BI SCREENING INCL CAD   3. Screening for breast cancer Z12.39 V76.10 [facility] MAMMO BI SCREENING INCL CAD   4. Light sensitivity L56.8 692.72 trimethoprim-polymyxin b (POLYTRIM) ophthalmic solution   5. Acute right eye pain H57.11 379.91 trimethoprim-polymyxin b (POLYTRIM) ophthalmic solution   6. Hot flashes due to menopause N95.1 627.2 norethindrone-ethinyl estradiol (JUNEL FE 1/20) 1 mg-20 mcg (21)/75 mg (7) tab       Start hormone therapy, keep consistent with exercises, weight loss. Discussed risk of blood clots  Uterus intact, unopposed estrogen not indicated. I have discussed the diagnosis with the patient and the intended plan as seen in the above orders. The patient has received an after-visit summary and questions were answered concerning future plans.      Medication Side Effects and Warnings were discussed with patient.     Follow-up Disposition: Not on R Muna Osborne MD

## 2017-08-02 NOTE — MR AVS SNAPSHOT
Visit Information Brodie Chen Personal Médico Departamento Teléfono del Dep. Número de visita 8/2/2017 10:15 AM Martinez Engel MD [facility] [phone] Follow-up Instructions Return in about 4 weeks (around 8/30/2017) for menopause symptoms check. Follow-up and Disposition History Your Appointments 8/28/2017  8:30 AM  
Any with Martinez Engel MD  
[address] [place] [address] and Primary Care [address] [place] [address]) Appt Note: 6 MONTH FOLLOW UP  
 [address] [facility]  
  
   
 [address] [id number] Upcoming Health Maintenance Date Due HEMOGLOBIN A1C Q6M 7/31/2017 INFLUENZA AGE 9 TO ADULT 8/1/2017 BREAST CANCER SCRN MAMMOGRAM 10/21/2017 FOOT EXAM Q1 1/31/2018 MICROALBUMIN Q1 1/31/2018 LIPID PANEL Q1 1/31/2018 FOBT Q 1 YEAR AGE 50-75 2/7/2018 EYE EXAM RETINAL OR DILATED Q1 3/14/2018 PAP AKA CERVICAL CYTOLOGY 9/14/2018 DTaP/Tdap/Td series (2 - Td) 2/27/2027 Alergias  Review Complete El: 8/2/2017 Por: MD Amada Canales [place] del:  8/2/2017 No Known Allergies Vacunas actuales Sandy [place] No hay ninguna vacuna archivada. No revisadas esta visita You Were Diagnosed With   
  
 Cesar Hernadez Prediabetes    -  Primary ICD-10-CM: R73.03 
ICD-9-CM: 790.29 New onset type 2 diabetes mellitus ([facility] [address]     ICD-10-CM: E11.9 ICD-9-CM: 250.00 Screening for breast cancer     ICD-10-CM: Z12.39 
ICD-9-CM: V76.10 Light sensitivity     ICD-10-CM: L56.8 ICD-9-CM: 692.72 Acute right eye pain     ICD-10-CM: H57.11 ICD-9-CM: 379.91 Hot flashes due to menopause     ICD-10-CM: N95.1 ICD-9-CM: 627.2 Partes vitales PS Pulso Temperatura Resp Minetto ( percentil de crecimiento) Peso (percentil de crecimiento)  133/80 (BP 1 Location: Left arm, BP Patient Position: Sitting) 77 98.7 °F (37.1 °C) (Oral) 16 5' (1.524 m) 153 lb 11.2 oz (69.7 kg) LMP (última phoenix) SpO2 BMI ([facility]) Estado obstétrico Estatus de tabaquísmo 05/02/2017 96% 30.02 kg/m2 Having regular periods Never Smoker Historial de signos vitales BMI and BSA Data Body Mass Index Body Surface Area 30.02 kg/m 2 1.72 m 2 [facility] Name Phone Paige Corinna [address], [address] [place] [address] Avenue [phone] Brown lista de medicamentos actualizada Lista actualizada el: 8/2/17 11:48 AM.  Vinicius Obrien use brown lista de medicamentos más reciente. cyclobenzaprine 5 mg tablet También conocido roselyn:  FLEXERIL Take 1-2 tablets at night for back pain. Muscle relaxer  
  
 diclofenac EC 75 mg EC tablet También conocido roselyn:  VOLTAREN  
75 mg.  
  
 metFORMIN 500 mg tablet También conocido roselyn:  GLUCOPHAGE Take 1 Tab by mouth daily (with breakfast). norethindrone-ethinyl estradiol 1 mg-20 mcg (21)/75 mg (7) Tab También conocido roselyn:  Lethia Chantale FE 1/20 Take 1 Tab by mouth daily. Do not take inactive pills  Indications: hot flashes PARoxetine 40 mg tablet También conocido roselyn:  PAXIL Take 1 Tab by mouth daily. trimethoprim-polymyxin b ophthalmic solution También conocido roselyn:  POLYTRIM Administer 1 Drop to both eyes every four (4) hours for 10 days. Recetas Enviado a la Temitope Refills  
 metFORMIN (GLUCOPHAGE) 500 mg tablet 4 Sig: Take 1 Tab by mouth daily (with breakfast). Class: Normal  
 Pharmacy: [facility], [address] Ph #: [phone] Route: Oral  
 trimethoprim-polymyxin b (POLYTRIM) ophthalmic solution 0 Sig: Administer 1 Drop to both eyes every four (4) hours for 10 days. Class: Normal  
 Pharmacy: [facility], [address] Ph #: [phone] Route: Both Eyes norethindrone-ethinyl estradiol (JUNEL FE 1/20) 1 mg-20 mcg (21)/75 mg (7) tab 4 Sig: Take 1 Tab by mouth daily. Do not take inactive pills  Indications: hot flashes Class: Normal  
 Pharmacy: [facility] [employer]  #: [phone] Route: Oral  
  
Hicimos lo siguiente AMB POC GLUCOSE BLOOD, BY GLUCOSE MONITORING DEVICE [72507 CPT(R)] HEMOGLOBIN A1C WITH EAG [63354 CPT(R)] METABOLIC PANEL, BASIC [86880 CPT(R)] Instrucciones de seguimiento Return in about 4 weeks (around 8/30/2017) for menopause symptoms check. Por hacer 08/02/2017 Imaging:  MARJAN MAMMO BI SCREENING INCL CAD Introducing [facility] & HEALTH SERVICES! [facility] introduce portal paciente MyChart . Ahora se puede acceder a partes de brown expediente médico, enviar por correo electrónico la oficina de brown médico y solicitar renovaciones de medicamentos en línea. En brown navegador de Internet , Sun Lopez a [email or web address] [employer]. com/mycTouchTunes Interactive Networkst Marixa clic en el usuario por Court Butter? Erin Wilson clic aquí en la sesión [facility]. Verá la página de registro [place]. Ingrese brown código de [facility] Nirmala kirk y roselyn aparece a continuación. Usted no tendrá que [employer] código después de avis completado el proceso de registro . Si usted no se inscribe antes de la fecha de caducidad , debe solicitar un nuevo código. · MyChart Código de acceso : [id number] Expires: 10/31/2017 11:48 AM 
 
Ingresa los últimos cuatro dígitos de brown Número de Seguro Social ( xxxx ) y fecha de nacimiento ( dd / mm / aaaa ) roselyn se indica y marixa clic en Enviar. Usted será llevado a la siguiente página de registro . Crear un ID MyChart . Esta será brown ID de inicio de sesión de MyChart y no puede ser [place] , por lo que pensar en charles que es Sylvia Jacobo y fácil de recordar . Crear charles contraseña MyChart . Usted puede cambiar brown contraseña en cualquier momento . Ingrese brown Password Reset de preguntas y [place]ge[place] . [place] se puede utilizar en un momento posterior si usted olvida brown contraseña. Introduzca brown dirección de correo electrónico . Leela Knows recibirá charles notificación por correo electrónico cuando la nueva información está disponible en MyChart . Burnis Ariane clic en Registrarse. Sonal Fuelling anita y descargar porciones de brown expediente médico. 
Paramjit clic en el enlace de descarga del menú Resumen para descargar charles copia portátil de brown información médica . Si tiene [employer] , por favor visite la sección de preguntas frecuentes del sitio web MyChart . Recuerde, Windationhart NO es que se utilizará para las necesidades urgentes. Para emergencias médicas , llame al 911 . Ahora disponible en brown iPhone y Android ! Por favor proporcione trey resumen de la documentación de cuidado a brown próximo proveedor. Your primary care clinician is listed as [employer] If you have any questions after today's visit, please call [phone].

## 2017-08-03 LAB
BUN SERPL-MCNC: 14 MG/DL (ref 6–24)
BUN/CREAT SERPL: 24 (ref 9–23)
CALCIUM SERPL-MCNC: 9.2 MG/DL (ref 8.7–10.2)
CHLORIDE SERPL-SCNC: 105 MMOL/L (ref 96–106)
CO2 SERPL-SCNC: 23 MMOL/L (ref 18–29)
CREAT SERPL-MCNC: 0.59 MG/DL (ref 0.57–1)
EST. AVERAGE GLUCOSE BLD GHB EST-MCNC: 126 MG/DL
GLUCOSE SERPL-MCNC: 99 MG/DL (ref 65–99)
HBA1C MFR BLD: 6 % (ref 4.8–5.6)
POTASSIUM SERPL-SCNC: 5 MMOL/L (ref 3.5–5.2)
SODIUM SERPL-SCNC: 140 MMOL/L (ref 134–144)

## 2017-08-29 ENCOUNTER — OFFICE VISIT (OUTPATIENT)
Dept: INTERNAL MEDICINE CLINIC | Age: 52
End: 2017-08-29

## 2017-08-29 VITALS
SYSTOLIC BLOOD PRESSURE: 107 MMHG | WEIGHT: 156.2 LBS | DIASTOLIC BLOOD PRESSURE: 71 MMHG | HEART RATE: 73 BPM | RESPIRATION RATE: 18 BRPM | BODY MASS INDEX: 30.67 KG/M2 | OXYGEN SATURATION: 100 % | TEMPERATURE: 98.1 F | HEIGHT: 60 IN

## 2017-08-29 DIAGNOSIS — N95.1 HOT FLASHES DUE TO MENOPAUSE: Primary | ICD-10-CM

## 2017-08-29 DIAGNOSIS — R73.03 PREDIABETES: ICD-10-CM

## 2017-08-29 RX ORDER — NORETHINDRONE ACETATE AND ETHINYL ESTRADIOL 1MG-20(21)
1 KIT ORAL DAILY
Qty: 3 PACKAGE | Refills: 4 | Status: SHIPPED | OUTPATIENT
Start: 2017-08-29 | End: 2018-02-27 | Stop reason: ALTCHOICE

## 2017-08-29 NOTE — PROGRESS NOTES
Ms. Huong Neville is a 47y.o. year old female who had concerns including Follow-up. 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 Hot flashes    Past Medical History:   Diagnosis Date    Degenerative arthritis of knee, bilateral     seen by ortho and injection    H/O mammogram 2012    normal    Pap smear for cervical cancer screening 4/2013    normal     Current Outpatient Prescriptions   Medication Sig Dispense    norethindrone-ethinyl estradiol (JUNEL FE 1/20) 1 mg-20 mcg (21)/75 mg (7) tab Take 1 Tab by mouth daily. Do not take inactive pills  Indications: hot flashes 3 Package    metFORMIN (GLUCOPHAGE) 500 mg tablet Take 1 Tab by mouth daily (with breakfast). 90 Tab     No current facility-administered medications for this visit. Reviewed PmHx, RxHx, FmHx, SocHx, AllgHx and updated and dated in the chart. ROS: Negative except for BOLD  General: fever, chills, fatigue  Respiratory: cough, SOB, wheezing  Cardiovascular:  CP, palpitation, CANTU, edema   Gastrointestinal: N/V/D, bleeding  Genito-Urinary: dysuria, hematuria  Musculoskeletal: muscle weakness, pain, swelling    OBJECTIVE:   Visit Vitals    /71 (BP 1 Location: Left arm, BP Patient Position: Sitting)    Pulse 73    Temp 98.1 °F (36.7 °C) (Oral)    Resp 18    Ht 5' (1.524 m)    Wt 156 lb 3.2 oz (70.9 kg)    LMP 08/29/2017    SpO2 100%    BMI 30.51 kg/m2     GEN: The patient appears well, alert, oriented x 3, in no distress. ENT: bilateral TM and canal normal.  Neck supple. No adenopathy or thyromegaly. JUAN. Lungs: clear bilaterally, good air entry, no wheezes, rhonchi or rales. Cardiovascular: regular rate and rhythm. S1 and S2 normal, no murmurs,  Abdomen: + BS, soft without tenderness, guarding, rebound, mass or organomegaly. Extremities: no edema, normal peripheral pulses. Neurological: normal, gross sensory and motor in tact without focal findings. Results for orders placed or performed in visit on 08/02/17   HEMOGLOBIN A1C WITH EAG   Result Value Ref Range    Hemoglobin A1c 6.0 (H) 4.8 - 5.6 %    Estimated average glucose 886 mg/dL   METABOLIC PANEL, BASIC   Result Value Ref Range    Glucose 99 65 - 99 mg/dL    BUN 14 6 - 24 mg/dL    Creatinine 0.59 0.57 - 1.00 mg/dL    GFR est non- >59 mL/min/1.73    GFR est  >59 mL/min/1.73    BUN/Creatinine ratio 24 (H) 9 - 23    Sodium 140 134 - 144 mmol/L    Potassium 5.0 3.5 - 5.2 mmol/L    Chloride 105 96 - 106 mmol/L    CO2 23 18 - 29 mmol/L    Calcium 9.2 8.7 - 10.2 mg/dL   AMB POC GLUCOSE BLOOD, BY GLUCOSE MONITORING DEVICE   Result Value Ref Range    Glucose  mg/dL         Assessment/ Plan:       ICD-10-CM ICD-9-CM    1. Hot flashes due to menopause N95.1 627.2 norethindrone-ethinyl estradiol (JUNEL FE 1/20) 1 mg-20 mcg (21)/75 mg (7) tab   2. Prediabetes R73.03 790.29    3. BMI 30.0-30.9,adult Z68.30 V85.30        Weight loss- goal 8 pounds by next follow up. I have discussed the diagnosis with the patient and the intended plan as seen in the above orders. The patient has received an after-visit summary and questions were answered concerning future plans. Medication Side Effects and Warnings were discussed with patient. Follow-up Disposition:  Return in about 6 months (around 2/28/2018) for PreDiabetes, weight loss.       Moy Parker MD

## 2017-08-29 NOTE — MR AVS SNAPSHOT
Visit Information Al Schaefer Personal Médico Departamento Teléfono del Dep. Número de visita 8/29/2017  3:30 PM Rocio Mendoza MD [facility] [phone] Follow-up Instructions Return in about 6 months (around 2/28/2018) for PreDiabetes, weight loss. Your Appointments 2/27/2018  3:30 PM  
Any with Rocio Mendoza MD  
[address] [place] [address] and Primary Care [facility]) Appt Note: follow up 6mnths PreDiabetes, weight loss [address] [place]  
  
   
 [phone] [id number] Upcoming Health Maintenance Date Due  
 BREAST CANCER SCRN MAMMOGRAM 10/21/2017 FOOT EXAM Q1 1/31/2018 MICROALBUMIN Q1 1/31/2018 LIPID PANEL Q1 1/31/2018 HEMOGLOBIN A1C Q6M 2/2/2018 FOBT Q 1 YEAR AGE 50-75 2/7/2018 EYE EXAM RETINAL OR DILATED Q1 3/14/2018 PAP AKA CERVICAL CYTOLOGY 9/14/2018 DTaP/Tdap/Td series (2 - Td) 2/27/2027 Alergias  Review Complete El: 8/29/2017 Por: Christianna Lombard, LPN A partir del:  8/29/2017 No Known Allergies Vacunas actuales Luis Commander No hay ninguna vacuna archivada. No revisadas esta visita You Were Diagnosed With   
  
 Latrell Ramal Hot flashes due to menopause     ICD-10-CM: N95.1 ICD-9-CM: 627.2 Partes vitales PS Pulso Temperatura Resp [place] ( percentil de crecimiento) Peso (percentil de crecimiento) 107/71 (BP 1 Location: Left arm, BP Patient Position: Sitting) 73 98.1 °F (36.7 °C) (Oral) 18 5' (1.524 m) 156 lb 3.2 oz (70.9 kg) LMP (última phoenix) SpO2 BMI (IMC) Estado obstétrico Estatus de tabaquísmo 08/29/2017 100% 30.51 kg/m2 Having regular periods Never Smoker BMI and BSA Data Body Mass Index Body Surface Area 30.51 kg/m 2 1.73 m 2 [facility] Name Phone Chiara Fischer [address], [address] [place] [address] [phone] Kaplan lista de medicamentos actualizada Lista actualizada el: 8/29/17  4:13 PM.  Aileen Parikh use kaplan lista de medicamentos más reciente. metFORMIN 500 mg tablet También conocido roselyn:  GLUCOPHAGE Take 1 Tab by mouth daily (with breakfast). norethindrone-ethinyl estradiol 1 mg-20 mcg (21)/75 mg (7) Tab También conocido roselyn:  [place] FE 1/20 Take 1 Tab by mouth daily. Do not take inactive pills  Indications: hot flashes Recetas Enviado a la [place] Refills  
 norethindrone-ethinyl estradiol (JUNEL FE 1/20) 1 mg-20 mcg (21)/75 mg (7) tab 4 Sig: Take 1 Tab by mouth daily. Do not take inactive pills  Indications: hot flashes Class: Normal  
 Pharmacy: Chiara Amado [facility], [address] [facility] #: [phone] Route: Oral  
  
Instrucciones de seguimiento Return in about 6 months (around 2/28/2018) for PreDiabetes, weight loss. Introducing [facility]! [facility] introduce portal paciente MyChart . Ahora se puede acceder a partes de kaplan expediente médico, enviar por correo electrónico la oficina de kaplan médico y solicitar renovaciones de medicamentos en línea. En kaplan navegador de Internet , [place] a [email or web address] Marixa clic en el usuario por Tobey Nissen? Paris Love clic aquí en la sesión Phuong Baker. Verá la página de registro Pierce. Ingrese kaplan código de [employer] kirk y roselyn aparece a continuación. Usted no tendrá que [employer] código después de avis completado el proceso de registro . Si usted no se inscribe antes de la fecha de caducidad , debe solicitar un nuevo código. · MyChart Código de acceso : [id number] Expires: 10/31/2017 11:48 AM 
 
Ingresa los últimos cuatro dígitos de kaplan Número de Seguro Social ( xxxx ) y fecha de nacimiento ( dd / mm / aaaa ) roselyn se indica y marixa clic en Enviar. Usted será llevado a la siguiente página de registro . Crear un ID MyChart . Esta será brown ID de inicio de sesión de MyChart y no puede ser [place] , por lo que pensar en charles que es Gemma Grills y fácil de recordar . Crear charles contraseña MyChart . Usted puede cambiar brown contraseña en cualquier momento . Ingrese brown Password Reset de preguntas y [place] . [place] se puede utilizar en un momento posterior si usted olvida brown contraseña. Introduzca brown dirección de correo electrónico . Dearl Roscoe recibirá charles notificación por correo electrónico cuando la nueva información está disponible en MyChart . Creed Hipps clic en Registrarse. Vianne Amelie anita y descargar porciones de brown expediente médico. 
Paramjit clic en el enlace de descarga del menú Resumen para descargar charles copia portátil de brown información médica . Si tiene [employer] , por favor visite la sección de preguntas frecuentes del sitio web MyChart . Recuerde, MyChart NO es que se utilizará para las necesidades urgentes. Para emergencias médicas , llame al 911 . Ahora disponible en brown iPhone y Android ! Por favor proporcione trey resumen de la documentación de cuidado a brown próximo proveedor. Your primary care clinician is listed as Jose Cuevas. If you have any questions after today's visit, please call [phone].

## 2017-08-29 NOTE — PROGRESS NOTES
1. Have you been to the ER, urgent care clinic since your last visit? Hospitalized since your last visit? No    2. Have you seen or consulted any other health care providers outside of the [address] [place] [address] since your last visit? Include any pap smears or colon screening.  No      Wants to know about her lab results from last visit

## 2017-09-26 ENCOUNTER — OFFICE VISIT (OUTPATIENT)
Dept: INTERNAL MEDICINE CLINIC | Age: 52
End: 2017-09-26

## 2017-09-26 VITALS
WEIGHT: 156.7 LBS | HEIGHT: 60 IN | BODY MASS INDEX: 30.77 KG/M2 | HEART RATE: 71 BPM | DIASTOLIC BLOOD PRESSURE: 77 MMHG | RESPIRATION RATE: 16 BRPM | SYSTOLIC BLOOD PRESSURE: 113 MMHG | TEMPERATURE: 98 F | OXYGEN SATURATION: 98 %

## 2017-09-26 DIAGNOSIS — M79.671 HEEL PAIN, BILATERAL: ICD-10-CM

## 2017-09-26 DIAGNOSIS — M79.672 HEEL PAIN, BILATERAL: ICD-10-CM

## 2017-09-26 DIAGNOSIS — R52 BODY ACHES: ICD-10-CM

## 2017-09-26 DIAGNOSIS — J02.0 STREP PHARYNGITIS: ICD-10-CM

## 2017-09-26 DIAGNOSIS — M72.2 PLANTAR FASCIITIS: ICD-10-CM

## 2017-09-26 DIAGNOSIS — J02.9 SORE THROAT: Primary | ICD-10-CM

## 2017-09-26 DIAGNOSIS — R50.9 FEVER, UNSPECIFIED FEVER CAUSE: ICD-10-CM

## 2017-09-26 LAB
S PYO AG THROAT QL: POSITIVE
VALID INTERNAL CONTROL?: YES

## 2017-09-26 RX ORDER — AMOXICILLIN 500 MG/1
500 CAPSULE ORAL 2 TIMES DAILY
Qty: 20 CAP | Refills: 0 | Status: SHIPPED | OUTPATIENT
Start: 2017-09-26 | End: 2017-10-06

## 2017-09-26 NOTE — MR AVS SNAPSHOT
Visit Information Marlena Grossman Personal Médico Departamento Teléfono del Dep. Número de visita 9/26/2017  8:30 AM Genny Figueroa MD [facility] and [address] [phone] Follow-up Instructions Return if symptoms worsen or fail to improve. Your Appointments 2/27/2018  3:30 PM  
Any with Genny Figueroa MD  
[address] [place] [address] and [facility] Appt Note: follow up 6mnths PreDiabetes, weight loss [address] [place]  
  
   
 [address] [id number] Upcoming Health Maintenance Date Due  
 BREAST CANCER SCRN MAMMOGRAM 10/21/2017 FOOT EXAM Q1 1/31/2018 MICROALBUMIN Q1 1/31/2018 LIPID PANEL Q1 1/31/2018 HEMOGLOBIN A1C Q6M 2/2/2018 FOBT Q 1 YEAR AGE 50-75 2/7/2018 EYE EXAM RETINAL OR DILATED Q1 3/14/2018 PAP AKA CERVICAL CYTOLOGY 9/14/2018 DTaP/Tdap/Td series (2 - Td) 2/27/2027 Alergias  Review Complete El: 9/26/2017 Por: Rasta Haaida A partir del:  9/26/2017 No Known Allergies Vacunas actuales Alma Marine No hay ninguna vacuna archivada. No revisadas esta visita You Were Diagnosed With   
  
 Shagufta Smiling Sore throat    -  Primary ICD-10-CM: J02.9 ICD-9-CM: 177 Strep pharyngitis     ICD-10-CM: J02.0 ICD-9-CM: 034.0 Fever, unspecified fever cause     ICD-10-CM: R50.9 ICD-9-CM: 780.60 Body aches     ICD-10-CM: R52 ICD-9-CM: 780.96 Partes vitales PS Pulso Temperatura Resp Hymera ( percentil de crecimiento) Peso (percentil de crecimiento) 113/77 71 98 °F (36.7 °C) (Oral) 16 5' (1.524 m) 156 lb 11.2 oz (71.1 kg) LMP (última phoenix) SpO2 BMI (IMC) Estado obstétrico Estatus de tabaquísmo 08/29/2017 98% 30.6 kg/m2 Having regular periods Never Smoker BMI and BSA Data  Body Mass Index Body Surface Area  
 30.6 kg/m 2 1.73 m 2  
  
  
 [facility] Name Phone [facility] [address] [place], [address] [place] [address] [phone] Kaplan lista de medicamentos actualizada Lista actualizada el: 9/26/17  9:27 AM.  Rosaline Juarez use kaplan lista de medicamentos más reciente. amoxicillin 500 mg capsule También conocido roselyn:  AMOXIL Take 1 Cap by mouth two (2) times a day for 10 days. metFORMIN 500 mg tablet También conocido roselyn:  GLUCOPHAGE Take 1 Tab by mouth daily (with breakfast). norethindrone-ethinyl estradiol 1 mg-20 mcg (21)/75 mg (7) Tab También conocido roselyn:  Rich Agapito FE 1/20 Take 1 Tab by mouth daily. Do not take inactive pills  Indications: hot flashes Recetas Enviado a la [place] Refills  
 amoxicillin (AMOXIL) 500 mg capsule 0 Sig: Take 1 Cap by mouth two (2) times a day for 10 days. Class: Normal  
 Pharmacy: [facility] 77, [email or web address] [facility] #: [phone] Route: Oral  
  
Hicimos lo siguiente AMB POC RAPID STREP A [54151 CPT(R)] Instrucciones de seguimiento Return if symptoms worsen or fail to improve. Instrucciones para el Paciente Faringitis estreptocócica: Instrucciones de cuidado - [ Strep Throat: Care Instructions ] Instrucciones de cuidado La faringitis estreptocócica es charles infección bacteriana que provoca fiebre y dolor de garganta repentinos e intensos. La faringitis estreptocócica, causada por bacterias llamadas estreptococos, se trata con antibióticos. En ocasiones es necesaria charles prueba de estreptococos para determinar si el dolor de garganta es causado por esta bacteria. El tratamiento puede ayudar a aliviar los síntomas y podría prevenir problemas futuros. La atención de seguimiento es charles parte clave de kaplan tratamiento y seguridad. Asegúrese de hacer y acudir a todas las citas, y llame a kaplan médico si está teniendo problemas.  También es charles buena idea [employer] resultados de susie exámenes y mantener charles lista de los medicamentos que mercy. Cómo puede cuidarse en el hogar? · [address] No deje de tomarlos por el hecho de sentirse mejor. Debe lukas todos los antibióticos hasta terminarlos. · La faringitis estreptocócica puede contagiarse a otros hasta 24 horas después de iniciado el tratamiento con antibióticos. Kaylyn trey Jaziel, usted debe evitar el contacto con otras personas en el trabajo o brown hogar, especialmente con bebés y [place]. No estornude ni tosa cerca de iMove, y [place] las adriana con frecuencia. Adriana Asp y utensilios de los demás y lávelos laya con [place] y [place]. · Paramjit gárgaras con agua tibia con sal cada hora, roselyn mínimo, para ayudar a reducir la hinchazón y sentirse mejor de la garganta. Mezcle 1 cucharadita de sal con 8 onzas líquidas (240 ml) de agua tibia. · [place] un analgésico (medicamento para el dolor) de venta tasha, roselyn acetaminofén (Tylenol), ibuprofeno (Advil, Motrin) o naproxeno (Aleve). Nuria y siga todas las instrucciones de la [place]. · Pruebe un aerosol anestésico o pastillas para la garganta de venta [place], los cuales pueden ayudar a aliviar el dolor de garganta. · [place] abundantes líquidos. Los líquidos podrían ayudar a aliviar la irritación de la garganta. Los líquidos calientes, roselyn el té o las sopas, podrían ayudarle a sentirse mejor de la garganta. · Coma alimentos sólidos suaves y kandis abundantes líquidos genesis. También podrían aliviar el dolor de garganta las paletas de agua de sabores, helado, huevos revueltos, sorbete y [place] de gelatina (roselyn Jell-O). · Descanse mucho. · No fume y evite el humo de otros. Si necesita ayuda para dejar de fumar, hable con brown médico sobre programas y medicamentos para dejar de fumar. Estos pueden aumentar susie probabilidades de dejar el hábito para siempre. · Utilice un vaporizador o humidificador para añadir humedad al aire de brown dormitorio. Siga las instrucciones para limpiar el aparato. Cuándo debe pedir ayuda? Llame a brown médico ahora mismo o busque atención médica inmediata si: · Tiene fiebre nueva o más bandar. · Tiene fiebre junto con rigidez en el edward o un dolor de newton intenso. · Tiene dificultades para tragar o Erven Blazer. · El dolor de garganta empeora mucho en un lado. · El dolor se vuelve mucho más intenso en un lado de la garganta. Preste especial atención a los cambios en brown nidhi y asegúrese de comunicarse con brown médico si: 
· No mejora después de 2 días (48 horas). · No mejora roselyn se esperaba. Dónde puede encontrar más información en inglés? Aspen Scrivener a http://trip-joceline.info/. Victor Manuel Roman B473 en la búsqueda para aprender Gorge Ferrera de \"Faringitis estreptocócica: Instrucciones de cuidado - [ Strep Throat: Care Instructions ]. \" 
Revisado: 29 julio, 2016 Versión del contenido: 11.3 © 7052-6281 Healthwise, Incorporated. Las instrucciones de cuidado fueron adaptadas bajo licencia por [facility] (which disclaims liability or warranty for this information). Si usted tiene [place]ma [place] afección médica o sobre estas instrucciones, siempre pregunte a brown profesional de nidhi. Healthwise, Incorporated niega toda garantía o responsabilidad por brown uso de esta información. Fascitis plantar: Instrucciones de cuidado - [ Plantar Fasciitis: Care Instructions ] Instrucciones de cuidado La fascitis plantar es un dolor e inflamación de la fascia plantar, el tejido en la parte inferior del pie que conecta el hueso del talón a los dedos del pie. La fascia plantar también sostiene el arco. Si tensiona la fascia plantar, puede desarrollar pequeños desgarros y causar dolor en el talón al levantarse o al caminar.  
La fascitis plantar puede ser ocasionada por correr o practicar otros deportes. También puede presentarse en personas con sobrepeso o que tienen whitney altos o pies planos. Usted puede tener fascitis plantar si camina o permanece parado [employer], o tiene un tendón de Rene tenso o músculos de la pantorrilla rígidos. Puede mejorar brown dolor de pie con descanso y [de-identified] cuidados en el hogar. Podría llevar unas cuantas semanas a algunos meses para que brown pie sane completamente. La atención de seguimiento es charles parte clave de brown tratamiento y seguridad. Asegúrese de hacer y acudir a todas las citas, y llame a brown médico si está teniendo problemas. También es charles buena idea saber los resultados de los exámenes y mantener charles lista de los medicamentos que mercy. Cómo puede cuidarse en el [facility]? · Descanse brown pie a menudo. Reduzca brown actividad hasta un nivel que le permita evitar el dolor. Si es posible, no corra ni camine sobre superficies duras. · [employer] analgésicos (medicamentos para el dolor) exactamente según las indicaciones. ¨ Si el médico le recetó un analgésico, tómelo según las indicaciones. ¨ Si no está tomando un analgésico recetado, tómese un antiinflamatorio de venta tasha para el dolor y la hinchazón, roselyn ibuprofeno (Advil, Motrin) o naproxeno (Aleve). Nuria y siga todas las instrucciones de la [place]. · Aplíquese masajes con hielo para ayudar con el dolor y la hinchazón. Puede utilizar cubitos de hielo o charles copa de hielo varias veces al día. Para hacer charles copa de hielo, llene charles taza de papel con agua y congélela. Stew la parte superior de la taza hasta que sobresalga media pulgada (1.25 cm) de hielo. Sosténgala por la parte remanente de papel para utilizar la copa. Frote el hielo en círculos pequeños sobre la katya gina 5 a 7 minutos. · Los kari alternados con [place] y agua fría también pueden ayudar a disminuir la hinchazón.  Husam roselyn el calor solo podría empeorar el dolor y la hinchazón, finalice un baño de contraste sumergiendo el pie en agua fría. · Use charles tablilla (férula) nocturna si brown médico se lo sugiere. Charles tablilla nocturna mantiene el pie con los dedos apuntando Dodge y el pie y el tobillo a un ángulo de 90 grados. Esta posición le proporciona un estiramiento suave y nohemi a la parte inferior del pie. · Paramjit ejercicios simples roselyn estiramientos de la pantorrilla y estiramientos con charles toalla 2 a 3 veces al día, especialmente al [employer]. Éstos pueden ayudar a la fascia plantar a volverse más flexible. También hacen que se fortalezcan los músculos que soportan el arco. Mantenga estos estiramientos gina 15 a 30 segundos cada vez. Repítalos 2 a 4 veces. ¨ Párese a 1 pie (31 cm) de la pared. Coloque las jai de ambas adriana contra la pared a la altura del pecho. Inclínese hacia adelante contra la pared, mantenga charles pierna con la rodilla recta y el talón en el suelo mientras dobla la rodilla de la otra pierna. ¨ Siéntese en el suelo o en charles colchoneta con los pies estirados al frente. Enrolle charles toalla a lo mike y colóquela alrededor de la parte anterior de la planta de chano de los pies. Sosteniendo la toalla por ambos extremos, jálela suavemente hacia usted para estirar brown pie. · Use zapatos con buen soporte para el arco. El calzado deportivo o los zapatos con la suela laya acolchada son chalres Amrita Crystal. · Pruebe con cuñas o plantillas (ortóticos) para ayudar a amortiguar el talón. Se pueden comprar en muchas tiendas de calzado. · Póngase los zapatos tan pronto se levante de la cama. Andar descalzo o usar pantuflas podría empeorar el dolor. · Alcance un buen peso de acuerdo a brown estatura, y [place]. [place] christian la carga de los pies. Cuándo debe pedir ayuda? Llame a brown médico ahora mismo o busque atención médica inmediata si: · Tiene dolor en el talón con fiebre, enrojecimiento o calor. · No puede cargar [facility] adolorido. Preste especial atención a los cambios en brown nidhi y asegúrese de comunicarse con brown médico si: 
· Siente hormigueo o entumecimiento en el talón. · El dolor en el talón dura más de 2 semanas. Dónde puede encontrar más información en inglés? Maria De Jesus Morrison a http://trip-joceline.info/. Yoly Yañez X899 en la búsqueda para aprender más acerca de \"Fascitis plantar: Instrucciones de cuidado - [ Plantar Fasciitis: Care Instructions ]. \" 
Revisado: 21 marzo, 2017 Versión del contenido: 11.3 © 3579-2819 Healthwise, Incorporated. Las instrucciones de cuidado fueron adaptadas bajo licencia por Good [facility] Connections (which disclaims liability or warranty for this information). Si usted tiene [place] afección médica o sobre estas instrucciones, siempre pregunte a brown profesional de nidhi. Healthwise, Incorporated niega toda garantía o responsabilidad por brown uso de esta información. Fascitis plantar: Ejercicios - [ Plantar Fasciitis: Exercises ] Instrucciones de cuidado Aquí se presentan algunos ejemplos de ejercicios típicos de rehabilitación para tratar brown afección. Empiece cada ejercicio lentamente. Reduzca la intensidad del ejercicio si Luciano Rinne a tener dolor. Brown médico o fisioterapeuta le dirán cuándo puede comenzar con estos ejercicios y cuáles funcionarán mejor para usted. Cómo hacer los ejercicios  
Nota: Cada ejercicio debe crear charles sensación tirante, saeed no debe causar dolor. Estiramiento con toalla 1. Siéntese con las piernas extendidas y las rodillas rectas. 2. Coloque charles toalla alrededor de brown pie xenia por debajo de los dedos. 3. Sostenga cada extremo de la toalla con cada mano, con las adriana por encima de las rodillas. 4. Jale hacia atrás con la toalla para que el pie se extienda hacia usted. 5. Mantenga la posición por lo menos de 15 a 30 segundos. 6. Repita 2 a 4 veces por sesión, hasta 5 veces al día. Estiramiento de pantorrilla Nota: Con trey ejercicio se estiran los músculos traseros de la parte inferior de la pierna (la pantorrilla) y el tendón de Rene. Paramjit trey ejercicio 3 o 4 veces al día, 5 días a la semana. 1. Póngase de pie frente a charles pared, con las adriana apoyadas en la pared a la altura de los ojos. Ponga la pierna que Sherran Lucrecia a estirar un paso atrás de la [place]. 2. Manteniendo colin talón en el suelo, doble la pierna de adelante hasta que sienta el estiramiento en la pierna de atrás. 3. Sostenga el estiramiento de 15 a 30 segundos. Repita de 2 a 4 veces. Estiramiento de la fascia plantar y la pantorrilla Nota: El estiramiento de la fascia plantar y los músculos de la pantorrilla puede aumentar la flexibilidad y disminuir el dolor del talón. Puede hacer trey ejercicio varias veces al día antes y después de la actividad. 1. Párese sobre un pie, roselyn se [place]. Asegúrese de sujetarse al barandal (pasamanos). 2. Lentamente pose susie talones sobre el borde del escalón a medida que relaja los músculos de la pantorrilla. Debe sentir un leve estiramiento en la parte inferior de brown pie y en la parte de atrás de brown pierna hasta la rodilla. 3. Mantenga trey estiramiento de 15 a 30 segundos y Roseline apriete los músculos de la pantorrilla un poco para llevar el talón hacia atrás hasta el nivel del escalón. Repita de 2 a 4 veces. Flexiones con toalla 1. Estando sentado, coloque el pie sobre charles toalla en el suelo y acerque la toalla hacia usted con los dedos del pie. 2. Luego, usando [employer] dedos del pie, aleje la toalla de usted. Nota: Paramjit trey ejercicio más difícil colocando un objeto pesado, roselyn charles anais de sopa, en el otro extremo de la toalla. Recoger canicas 1. Ponga canicas en el suelo junto a charles taza. 2. Usando los dedos del pie, trate de levantar las canicas del piso y ponerlas en la taza.  
Bartolome العلي de seguimiento es charles parte clave de brown tratamiento y seguridad. Asegúrese de hacer y acudir a todas las citas, y llame a brown médico si está teniendo problemas. También es charles buena idea saber los resultados de susie exámenes y mantener charles lista de los medicamentos que mercy. Dónde puede encontrar más información en inglés? Lowell Luceroe a http://trip-joceline.info/. Arnie Hyde en la búsqueda para aprender más acerca de \"Fascitis plantar: Ejercicios - [ Plantar Fasciitis: Exercises ]. \" 
Revisado: 21 marzo, 2017 Versión del contenido: 11.3 © 9799-9338 Healthwise, Incorporated. Las instrucciones de cuidado fueron adaptadas bajo licencia por Good Help Connections (which disclaims liability or warranty for this information). Si usted tiene [facility] afección médica o sobre estas instrucciones, siempre pregunte a brown profesional de nidhi. Healthwise, Incorporated niega toda garantía o responsabilidad por brown uso de esta información. Introducing [facility]! [facility] introduce portal paciente [email or web address] . Ahora se puede acceder a partes de brown expediente médico, enviar por correo electrónico la oficina de brown médico y solicitar renovaciones de medicamentos en línea. En brown navegador de Internet , Anthony Mills a [email or web address] Marixa eyal en el usuario por Jodee Lone? Judy birch aquí en la sesión Titus Handy. Verá la página de registro Vadito. Ingrese brown código de [employer]irmala kirk y roselyn aparece a continuación. Usted no tendrá que [employer] código después de avis completado el proceso de registro . Si usted no se inscribe antes de la fecha de caducidad , debe solicitar un nuevo código. · MyChart Código de acceso : [id number] Expires: 10/31/2017 11:48 AM 
 
Ingresa los últimos cuatro dígitos de brown Número de Seguro Social ( xxxx ) y fecha de nacimiento ( dd / mm / aaaa ) roselyn se indica y marixa clic en Enviar. Usted será llevado a la siguiente página de registro . Crear un ID MyChart . Esta será brown ID de inicio de sesión de MyChart y no puede ser [place] , por lo que pensar en charles que es Tamica Hamming y fácil de recordar . Crear charles contraseña MyChart . Usted puede cambiar brown contraseña en cualquier momento . Ingrese brown Password Reset de preguntas y [place]ge[place] . [place] se puede utilizar en un momento posterior si usted olvida brown contraseña. Introduzca brown dirección de correo electrónico . Jose Gambino recibirá charles notificación por correo electrónico cuando la nueva información está disponible en MyChart . Gabby Ro clic en Registrarse. Marti Pu anita y descargar porciones de brown expediente médico. 
Paramjit clic en el enlace de descarga del menú Resumen para descargar charles copia portátil de brown información médica . Si tiene [employer] , por favor visite la sección de preguntas frecuentes del sitio web MyChart . Recuerde, MyChart NO es que se utilizará para las necesidades urgentes. Para emergencias médicas , llame al 911 . Ahora disponible en brown iPhone y Android ! Por favor proporcione trey resumen de la documentación de cuidado a brown próximo proveedor. Your primary care clinician is listed as [employer] If you have any questions after today's visit, please call [phone].

## 2017-09-26 NOTE — PROGRESS NOTES
SUBJECTIVE:   Chief Complaint   Patient presents with    Sore Throat     (RM 5)    Generalized Body Aches     Since sunday       Lakeisha Paredes is a 47 y.o. female who complains of sore throat, swollen glands, myalgias and fever for 3 days. She denies a history of dizziness, shortness of breath and vomiting and denies a history of asthma. Patient does not smoke cigarettes. Heel pain, worse in AM. Bilateral. Better through the day. Started in bottom of feet, now on back of heel bilaterally on side. Neg metatarsal tenderness. OBJECTIVE:  She appears well, vital signs are as noted. Ears normal.  Throat and pharynx normal.  Neck supple. No adenopathy in the neck. Nose is congested. Sinuses non tender. The chest is clear, without wheezes or rales. Left heel pain, tender to palpation heel no erythema or fluctuance. FROM    ASSESSMENT:   strep pharyngitis  Plantar fasciitis    PLAN:  Symptomatic therapy suggested: push fluids, rest, gargle warm salt water, use acetaminophen prn and return office visit prn if symptoms persist or worsen. Lack of antibiotic effectiveness discussed with her. Call or return to clinic prn if these symptoms worsen or fail to improve as anticipated. ICD-10-CM ICD-9-CM    1. Sore throat J02.9 462 AMB POC RAPID STREP A      amoxicillin (AMOXIL) 500 mg capsule   2. Strep pharyngitis J02.0 034.0 amoxicillin (AMOXIL) 500 mg capsule   3. Fever, unspecified fever cause R50.9 780.60    4. Body aches R52 780.96    5. Plantar fasciitis M72.2 728.71    6. Heel pain, bilateral M79.671 729.5     M79.672       Pt starting PT today for heel pain, pt told to discuss plantar fasciitis with therapy sessions. Exercises given. Follow up if not better. Follow-up Disposition:  Return if symptoms worsen or fail to improve.     Shaye Hernandez MD

## 2017-09-26 NOTE — PATIENT INSTRUCTIONS
Faringitis estreptocócica: Instrucciones de cuidado - [ Strep Throat: Care Instructions ]  Instrucciones de cuidado    La faringitis estreptocócica es charles infección bacteriana que provoca fiebre y dolor de garganta repentinos e intensos. La faringitis estreptocócica, causada por bacterias llamadas estreptococos, se trata con antibióticos. En ocasiones es necesaria charles prueba de estreptococos para determinar si el dolor de garganta es causado por esta bacteria. El tratamiento puede ayudar a aliviar los síntomas y podría prevenir problemas futuros. La atención de seguimiento es charles parte clave de brown tratamiento y seguridad. Asegúrese de hacer y acudir a todas las citas, y llame a brown médico si está teniendo problemas. También es charles buena idea saber los resultados de susie exámenes y mantener charles lista de los medicamentos que mercy. ¿Cómo puede cuidarse en el hogar? · [address] No deje de tomarlos por el hecho de sentirse mejor. Debe lukas todos los antibióticos hasta terminarlos. · La faringitis estreptocócica puede contagiarse a otros hasta 24 horas después de iniciado el tratamiento con antibióticos. Kaylyn trey Jaziel, usted debe evitar el contacto con otras personas en el trabajo o brown hogar, especialmente con bebés y [place]. No estornude ni tosa cerca de [employer] y [place] las adriana con frecuencia. Radha Camp y utensilios de los demás y lávelos laya con [place] y Gerardo. · Paramjit gárgaras con agua tibia con sal cada hora, roselyn mínimo, para ayudar a reducir la hinchazón y sentirse mejor de la garganta. Mezcle 1 cucharadita de sal con 8 onzas líquidas (240 ml) de agua tibia. · Lena un analgésico (medicamento para el dolor) de venta tasha, roselyn acetaminofén (Tylenol), ibuprofeno (Advil, Motrin) o naproxeno (Aleve). Nuria y siga todas las instrucciones de la [place].   · Pruebe un aerosol anestésico o pastillas para la garganta de [employer], los cuales pueden ayudar a aliviar el dolor de garganta. · [place] abundantes líquidos. Los líquidos podrían ayudar a aliviar la irritación de la garganta. Los líquidos calientes, roselyn el té o las sopas, podrían ayudarle a sentirse mejor de la garganta. · Coma alimentos sólidos suaves y kandis abundantes líquidos genesis. También podrían aliviar el dolor de garganta las paletas de agua de sabores, helado, huevos revueltos, sorbete y [place] de gelatina (roselyn Jell-O). · Descanse mucho. · No fume y evite el humo de otros. Si necesita ayuda para dejar de fumar, hable con brown médico sobre programas y medicamentos para dejar de fumar. Estos pueden aumentar susie probabilidades de dejar el hábito para siempre. · Utilice un vaporizador o humidificador para añadir humedad al aire de brown dormitorio. Siga las instrucciones para limpiar el aparato. ¿Cuándo debe pedir ayuda? Llame a brown médico ahora mismo o busque atención médica inmediata si:  · Tiene fiebre nueva o más bandar. · Tiene fiebre junto con rigidez en el edward o un dolor de newton intenso. · Tiene dificultades para tragar o Erven Blazer. · El dolor de garganta empeora mucho en un lado. · El dolor se vuelve mucho más intenso en un lado de la garganta. Preste especial atención a los cambios en brown nidhi y asegúrese de comunicarse con brown médico si:  · No mejora después de 2 días (48 horas). · No mejora roselyn se esperaba. ¿Dónde puede encontrar más información en inglés? Aspen Scrivener a http://rohan.info/. Victor Manuel Roman [id number] en la búsqueda para aprender Gorge Ferrera de \"Faringitis estreptocócica: Instrucciones de cuidado - [ Strep Throat: Care Instructions ]. \"  Revisado: 29 julio, 2016  Versión del contenido: 11.3  © 4804-5079 [employer], [employer]. Las instrucciones de cuidado fueron adaptadas bajo licencia por [employer] (which disclaims liability or warranty for this information).  Si usted tiene [place] afección médica o [place] estas instrucciones, siempre pregunte a brown profesional de nidhi. [employer] niega toda garantía o responsabilidad por brown uso de esta información. Fascitis plantar: Instrucciones de cuidado - [ Plantar Fasciitis: Care Instructions ]  Instrucciones de cuidado    La fascitis plantar es un dolor e inflamación de la fascia plantar, el tejido en la parte inferior del pie que conecta el hueso del talón a los dedos del pie. La fascia plantar también sostiene el arco. Si tensiona la fascia plantar, puede desarrollar pequeños desgarros y causar dolor en el talón al levantarse o al caminar. La fascitis plantar puede ser ocasionada por correr o practicar otros deportes. También puede presentarse en personas con sobrepeso o que tienen whitney altos o pies planos. Usted puede tener fascitis plantar si camina o permanece parado [employer], o tiene un tendón de [place] tenso o músculos de la pantorrilla rígidos. Puede mejorar brown dolor de pie con descanso y [de-identified] cuidados en el hogar. Podría llevar unas cuantas semanas a algunos meses para que brown pie sane completamente. La atención de seguimiento es charles parte clave de brown tratamiento y seguridad. Asegúrese de hacer y acudir a todas las citas, y llame a brown médico si está teniendo problemas. También es charles buena idea saber los resultados de los exámenes y mantener charles lista de los medicamentos que mercy. ¿Cómo puede cuidarse en el [facility]? · Descanse brown pie a menudo. Reduzca brown actividad hasta un nivel que le permita evitar el dolor. Si es posible, no corra ni camine sobre superficies duras. · [employer] analgésicos (medicamentos para el dolor) exactamente según las indicaciones. ¨ Si el médico le recetó un analgésico, tómelo según las indicaciones. ¨ Si no está tomando un analgésico recetado, tómese un antiinflamatorio de venta tasha para el dolor y la hinchazón, roselyn ibuprofeno (Advil, Motrin) o naproxeno (Aleve).  Nuria y siga todas las instrucciones de la etiqueta. · Aplíquese masajes con hielo para ayudar con el dolor y la hinchazón. Puede utilizar cubitos de hielo o charles copa de hielo varias veces al día. Para hacer charles copa de hielo, llene charles taza de papel con agua y congélela. Stew la parte superior de la taza hasta que sobresalga media pulgada (1.25 cm) de hielo. Sosténgala por la parte remanente de papel para utilizar la copa. Frote el hielo en círculos pequeños sobre la katya gina 5 a 7 minutos. · Los kari alternados con [place] y agua fría también pueden ayudar a disminuir la hinchazón. Husam roselyn el calor solo podría empeorar el dolor y la hinchazón, finalice un baño de contraste sumergiendo el pie en agua fría. · Use charles tablilla (férula) nocturna si brown médico se lo sugiere. Charles tablilla nocturna mantiene el pie con los dedos apuntando [place] y el pie y el tobillo a un ángulo de 90 grados. Esta posición le proporciona un estiramiento suave y nohemi a la parte inferior del pie. · Paramjit ejercicios simples roselyn estiramientos de la pantorrilla y estiramientos con charles toalla 2 a 3 veces al día, especialmente al [employer]. Éstos pueden ayudar a la fascia plantar a volverse más flexible. También hacen que se fortalezcan los músculos que soportan el arco. Mantenga estos estiramientos gina 15 a 30 segundos cada vez. Repítalos 2 a 4 veces. ¨ Párese a 1 pie (31 cm) de la pared. Coloque las jai de ambas adriana contra la pared a la altura del pecho. Inclínese hacia adelante contra la pared, mantenga charles pierna con la rodilla recta y el talón en el suelo mientras dobla la rodilla de la otra pierna. ¨ Siéntese en el suelo o en charles colchoneta con los pies estirados al frente. Enrolle charles toalla a lo mike y colóquela alrededor de la parte anterior de la planta de chano de los pies. Sosteniendo la toalla por ambos extremos, jálela suavemente hacia usted para estirar brown pie.   · Use zapatos con buen soporte para el arco. El calzado deportivo o los zapatos con la suela laya acolchada son charles Lopez Rodriguez. · Pruebe con cuñas o plantillas (ortóticos) para ayudar a amortiguar el talón. Se pueden comprar en muchas tiendas de calzado. · Póngase los zapatos tan pronto se levante de la cama. Andar descalzo o usar pantuflas podría empeorar el dolor. · Alcance un buen peso de acuerdo a kaplan estatura, y [place]. [place] christian la carga de los pies. ¿Cuándo debe pedir ayuda? Llame a kaplan médico ahora mismo o busque atención médica inmediata si:  · Tiene dolor en el talón con fiebre, enrojecimiento o calor. · No puede cargar [facility] adolorido. Preste especial atención a los cambios en kaplan nidhi y asegúrese de comunicarse con kaplan médico si:  · Siente hormigueo o entumecimiento en el talón. · El dolor en el talón dura más de 2 semanas. ¿Dónde puede encontrar más información en inglés? Zackary arias http://trip-joceline.info/. Bartolome Zazuetaache [id number] en la búsqueda para aprender más acerca de \"Fascitis plantar: Instrucciones de cuidado - [ Plantar Fasciitis: Care Instructions ]. \"  Revisado: 21 marzo, 2017  Versión del contenido: 11.3  © 4467-8834 Healthwise, Incorporated. Las instrucciones de cuidado fueron adaptadas bajo licencia por [employer] (which disclaims liability or warranty for this information). Si usted tiene [place] afección médica o sobre estas instrucciones, siempre pregunte a kaplan profesional de nidhi. Healthwise, Incorporated niega toda garantía o responsabilidad por kaplan uso de esta información. Fascitis plantar: Ejercicios - [ Plantar Fasciitis: Exercises ]  Instrucciones de cuidado  Aquí se presentan algunos ejemplos de ejercicios típicos de rehabilitación para tratar kaplan afección. Empiece cada ejercicio lentamente. Reduzca la intensidad del ejercicio si Rosmery Liset a tener dolor.   Kaplan médico o fisioterapeuta le dirán cuándo puede comenzar con estos ejercicios y cuáles funcionarán mejor para usted.  Cómo hacer los ejercicios   Nota: Cada ejercicio debe crear charles sensación tirante, saeed no debe causar dolor. Estiramiento con toalla    1. Siéntese con las piernas extendidas y las rodillas rectas. 2. Coloque charles toalla alrededor de brown pie xenia por debajo de los dedos. 3. Sostenga cada extremo de la toalla con cada mano, con las adriana por encima de las rodillas. 4. Jale hacia atrás con la toalla para que el pie se extienda hacia usted. 5. Mantenga la posición por lo menos de 15 a 30 segundos. 6. Repita 2 a 4 veces por sesión, hasta 5 veces al día. Estiramiento de pantorrilla    Nota: Con trey ejercicio se estiran los músculos traseros de la parte inferior de la pierna (la pantorrilla) y el tendón de Rene. Paramjit trey ejercicio 3 o 4 veces al día, 5 días a la semana. 1. Póngase de pie frente a charles pared, con las adriana apoyadas en la pared a la altura de los ojos. Ponga la pierna que Kari Plume a estirar un paso atrás de la [place]. 2. Manteniendo colin talón en el suelo, doble la pierna de adelante hasta que sienta el estiramiento en la pierna de atrás. 3. Sostenga el estiramiento de 15 a 30 segundos. Repita de 2 a 4 veces. Estiramiento de la fascia plantar y la pantorrilla    Nota: El estiramiento de la fascia plantar y los músculos de la pantorrilla puede aumentar la flexibilidad y disminuir el dolor del talón. Puede hacer trey ejercicio varias veces al día antes y después de la actividad. 1. Párese sobre un pie, roselyn se [place]. Asegúrese de sujetarse al barandal (pasamanos). 2. Lentamente pose susie talones sobre el borde del escalón a medida que relaja los músculos de la pantorrilla. Debe sentir un leve estiramiento en la parte inferior de brown pie y en la parte de atrás de brown pierna hasta la rodilla. 3. Mantenga trey estiramiento de 15 a 30 segundos y [place] apriete los músculos de la pantorrilla un poco para llevar el talón hacia atrás hasta el nivel del escalón.  Repita de 2 a 4 veces. Flexiones con toalla    1. Estando sentado, coloque el pie sobre charles toalla en el suelo y acerque la toalla hacia usted con los dedos del pie. 2. Luego, usando [employer] dedos del pie, aleje la toalla de usted. Nota: Paramjit trey ejercicio más difícil colocando un objeto pesado, roselyn charles anais de sopa, en el otro extremo de la toalla. Recoger canicas    1. Ponga canicas en el suelo junto a charles taza. 2. Usando los dedos del pie, trate de levantar las canicas del piso y ponerlas en la taza. La atención de seguimiento es charles parte clave de brown tratamiento y seguridad. Asegúrese de hacer y acudir a todas las citas, y llame a brown médico si está teniendo problemas. También es charles buena idea saber los resultados de susie exámenes y mantener charles lista de los medicamentos que mercy. ¿Dónde puede encontrar más información en inglés? Jill Stephens a http://trip-joceline.info/. Shi Mirian en la búsqueda para aprender más acerca de \"Fascitis plantar: Ejercicios - [ Plantar Fasciitis: Exercises ]. \"  Revisado: 21 marzo, 2017  Versión del contenido: 11.3  © 5665-6791 Healthwise, Incorporated. Las instrucciones de cuidado fueron adaptadas bajo licencia por [employer] (which disclaims liability or warranty for this information). Si usted tiene [place] afección médica o sobre estas instrucciones, siempre pregunte a brown profesional de nidhi. Healthwise, Incorporated niega toda garantía o responsabilidad por brown uso de esta información.

## 2017-09-26 NOTE — PROGRESS NOTES
Chief Complaint   Patient presents with    Sore Throat     (RM 5)    Generalized Body Aches     Since sunday     1. Have you been to the ER, urgent care clinic since your last visit? Hospitalized since your last visit? NO    2. Have you seen or consulted any other health care providers outside of the [address] [place] [address] since your last visit? Include any pap smears or colon screening. NO

## 2018-02-27 ENCOUNTER — OFFICE VISIT (OUTPATIENT)
Dept: INTERNAL MEDICINE CLINIC | Age: 53
End: 2018-02-27

## 2018-02-27 VITALS
BODY MASS INDEX: 31.22 KG/M2 | HEART RATE: 77 BPM | SYSTOLIC BLOOD PRESSURE: 104 MMHG | RESPIRATION RATE: 18 BRPM | TEMPERATURE: 97.9 F | DIASTOLIC BLOOD PRESSURE: 74 MMHG | HEIGHT: 60 IN | OXYGEN SATURATION: 96 % | WEIGHT: 159 LBS

## 2018-02-27 DIAGNOSIS — E78.2 MIXED HYPERLIPIDEMIA: ICD-10-CM

## 2018-02-27 DIAGNOSIS — R73.03 PREDIABETES: Primary | ICD-10-CM

## 2018-02-27 NOTE — PROGRESS NOTES
Chief Complaint   Patient presents with    Diabetes     rm 6 here to follow up       1. Have you been to the ER, urgent care clinic since your last visit? Hospitalized since your last visit? No    2. Have you seen or consulted any other health care providers outside of the [address] [place] [address] since your last visit? Include any pap smears or colon screening.  No

## 2018-02-27 NOTE — MR AVS SNAPSHOT
[address] [place] [address]. Joana 90 [id number] 
[phone] Patient: Romero Conte MRN: [id number] :1965 Visit Information Sandra Beauchamp y Celina Personal Médico Departamento Teléfono del Dep. Número de visita 2018  3:30 PM Duane Watkins MD [facility] [phone] Follow-up Instructions Return in about 2 weeks (around 3/13/2018) for CPE. Follow-up and Disposition History Upcoming Health Maintenance Date Due  
 BREAST CANCER SCRN MAMMOGRAM 10/21/2017 FOBT Q 1 YEAR AGE 50-75 2018 PAP AKA CERVICAL CYTOLOGY 2018 DTaP/Tdap/Td series (2 - Td) 2027 Alergias  Review Complete El: 2018 Por: Yenni Elizondo A partir del:  2018 No Known Allergies Vacunas actuales Jeananne Clear No hay ninguna vacuna archivada. No revisadas esta visita You Were Diagnosed With   
  
 Emily Xiong Prediabetes    -  Primary ICD-10-CM: R73.03 
ICD-9-CM: 790.29 Mixed hyperlipidemia     ICD-10-CM: E78.2 ICD-9-CM: 272.2 BMI 31.0-31.9,adult     ICD-10-CM: Z68.31 
ICD-9-CM: V85.31 Partes vitales PS Pulso Temperatura Resp [place] ( percentil de crecimiento) Peso (percentil de crecimiento) 104/74 (BP 1 Location: Right arm, BP Patient Position: Sitting) 77 97.9 °F (36.6 °C) (Oral) 18 5' (1.524 m) 159 lb (72.1 kg) LMP (última phoenix) SpO2 BMI (IMC) Estado obstétrico Estatus de tabaquísmo 2018 (Approximate) 96% 31.05 kg/m2 Having regular periods Never Smoker BMI and BSA Data Body Mass Index Body Surface Area 31.05 kg/m 2 1.75 m 2 [facility] [facility] Phone Javed Bartlett [address] [facility], [address] [place] [address] [phone] Kaplan lista de medicamentos actualizada Raciel Milling actualizada 18  4:29 PM.  Win Camacho use kaplan lista de medicamentos más reciente. metFORMIN 500 mg tablet También conocido roselyn:  GLUCOPHAGE Take 1 Tab by mouth daily (with breakfast). Hicimos lo siguiente COLLECTION VENOUS BLOOD,VENIPUNCTURE [id number] CPT(R)] HEMOGLOBIN A1C WITH EAG [12448 CPT(R)] LIPID PANEL [27636 CPT(R)] METABOLIC PANEL, BASIC [32814 CPT(R)] Instrucciones de seguimiento Return in about 2 weeks (around 3/13/2018) for CPE. Instrucciones para el Paciente Fascitis plantar: Instrucciones de cuidado - [ Plantar Fasciitis: Care Instructions ] Instrucciones de cuidado La fascitis plantar es un dolor e inflamación de la fascia plantar, el tejido en la parte inferior del pie que conecta el hueso del talón a los dedos del pie. La fascia plantar también sostiene el arco. Si tensiona la fascia plantar, puede desarrollar pequeños desgarros y causar dolor en el talón al levantarse o al caminar. La fascitis plantar puede ser ocasionada por correr o practicar otros deportes. También puede presentarse en personas con sobrepeso o que tienen whitney altos o pies planos. Usted puede tener fascitis plantar si camina o permanece parado [employer], o tiene un tendón de Rene tenso o músculos de la pantorrilla rígidos. Puede mejorar brown dolor de pie con descanso y [de-identified] cuidados en el hogar. Podría llevar unas cuantas semanas a algunos meses para que brown pie sane completamente. La atención de seguimiento es charles parte clave de brown tratamiento y seguridad. Asegúrese de hacer y acudir a todas las citas, y llame a brown médico si está teniendo problemas. También es charles buena idea saber los resultados de los exámenes y mantener charles lista de los medicamentos que mercy. Cómo puede cuidarse en el hogar? · Descanse brown pie a menudo. Reduzca brown actividad hasta un nivel que le permita evitar el dolor. Si es posible, no corra ni camine sobre superficies duras.  
· [employer] analgésicos (medicamentos para el dolor) exactamente según las indicaciones. ¨ Si el médico le recetó un analgésico, tómelo según las indicaciones. ¨ Si no está tomando un analgésico recetado, tómese un antiinflamatorio de venta tasha para el dolor y la hinchazón, roselyn ibuprofeno (Advil, Motrin) o naproxeno (Aleve). Nuria y siga todas las instrucciones de la [place]. · Aplíquese masajes con hielo para ayudar con el dolor y la hinchazón. Puede utilizar cubitos de hielo o charles copa de hielo varias veces al día. Para hacer charles copa de hielo, llene charles taza de papel con agua y congélela. Stew la parte superior de la taza hasta que sobresalga media pulgada (1.25 cm) de hielo. Sosténgala por la parte remanente de papel para utilizar la copa. Frote el hielo en círculos pequeños sobre la katya gina 5 a 7 minutos. · Los kari alternados con [place] y agua fría también pueden ayudar a disminuir la hinchazón. Husam roselyn el calor solo podría empeorar el dolor y la hinchazón, finalice un baño de contraste sumergiendo el pie en agua fría. · Use charles tablilla (férula) nocturna si brown médico se lo sugiere. Charles tablilla nocturna mantiene el pie con los dedos apuntando [place] y el pie y el tobillo a un ángulo de 90 grados. Esta posición le proporciona un estiramiento suave y nohemi a la parte inferior del pie. · Paramjit ejercicios simples roselyn estiramientos de la pantorrilla y estiramientos con charles toalla 2 a 3 veces al día, especialmente al [employer]. Éstos pueden ayudar a la fascia plantar a volverse más flexible. También hacen que se fortalezcan los músculos que soportan el arco. Mantenga estos estiramientos gina 15 a 30 segundos cada vez. Repítalos 2 a 4 veces. ¨ Párese a 1 pie (31 cm) de la pared. Coloque las jai de ambas adriana contra la pared a la altura del pecho. Inclínese hacia adelante contra la pared, mantenga charles pierna con la rodilla recta y el talón en el suelo mientras dobla la rodilla de la otra pierna. ¨ Siéntese en el suelo o en charles colchoneta con los pies estirados al frente. Enrolle charles toalla a lo mike y colóquela alrededor de la parte anterior de la planta de chano de los pies. Sosteniendo la toalla por ambos extremos, jálela suavemente hacia usted para estirar brown pie. · Use zapatos con buen soporte para el arco. El calzado deportivo o los zapatos con la suela laya acolchada son charles Vonna Figures. · Pruebe con cuñas o plantillas (ortóticos) para ayudar a amortiguar el talón. Se pueden comprar en muchas tiendas de calzado. · Póngase los zapatos tan pronto se levante de la cama. Andar descalzo o usar pantuflas podría empeorar el dolor. · Alcance un buen peso de acuerdo a brown estatura, y [place]. [place] christian la carga de los pies. Cuándo debe pedir ayuda? Llame a brown médico ahora mismo o busque atención médica inmediata si: 
? · Tiene dolor en el talón con fiebre, enrojecimiento o calor. ? · No puede cargar [employer] adolorido. ?Preste especial atención a los cambios en brown nidhi y asegúrese de comunicarse con brown médico si: 
? · Siente hormigueo o entumecimiento en el talón. ? · El dolor en el talón dura más de 2 semanas. Dónde puede encontrar más información en inglés? Jerry Estes a http://trip-joceline.info/. Augustina Wood G777 en la búsqueda para aprender más acerca de \"Fascitis plantar: Instrucciones de cuidado - [ Plantar Fasciitis: Care Instructions ]. \" 
Revisado: 21 marzo, 2017 Versión del contenido: 11.4 © 0257-3950 Healthwise, Incorporated. Las instrucciones de cuidado fueron adaptadas bajo licencia por [employer] (which disclaims liability or warranty for this information). Si usted tiene [place] afección médica o sobre estas instrucciones, siempre pregunte a brown profesional de nidhi. Healthwise, Incorporated niega toda garantía o responsabilidad por brown uso de esta información. Fascitis plantar: Ejercicios - [ Plantar Fasciitis: Exercises ] Instrucciones de cuidado Aquí se presentan algunos ejemplos de ejercicios típicos de rehabilitación para tratar brown afección. Empiece cada ejercicio lentamente. Reduzca la intensidad del ejercicio si Adeola Piero a tener dolor. Brown médico o fisioterapeuta le dirán cuándo puede comenzar con estos ejercicios y cuáles funcionarán mejor para usted. Cómo hacer los ejercicios Estiramiento con toalla 1. Siéntese con las piernas extendidas y las rodillas rectas. 2. Coloque charles toalla alrededor de brown pie xenia por debajo de los dedos. 3. Sostenga cada extremo de la toalla con cada mano, con las adriana por encima de las rodillas. 4. Jale hacia atrás con la toalla para que el pie se extienda hacia usted. 5. Mantenga la posición por lo menos de 15 a 30 segundos. 6. Repita 2 a 4 veces por sesión, hasta 5 veces al día. Estiramiento de pantorrilla 1. Nota: Con trey ejercicio se estiran los músculos traseros de la parte inferior de la pierna (la pantorrilla) y el tendón de Rene. Paramjit trey ejercicio 3 o 4 veces al día, 5 días a la semana. 2. Póngase de pie frente a charles pared, con las adriana apoyadas en la pared a la altura de los ojos. Ponga la pierna que Mirian Makua a estirar un paso atrás de la [place]. 3. Manteniendo colin talón en el suelo, doble la pierna de adelante hasta que sienta el estiramiento en la pierna de atrás. 4. Sostenga el estiramiento de 15 a 30 segundos. Repita de 2 a 4 veces. Estiramiento de la fascia plantar y la pantorrilla 1. Nota: El estiramiento de la fascia plantar y los músculos de la pantorrilla puede aumentar la flexibilidad y disminuir el dolor del talón. Puede hacer trey ejercicio varias veces al día antes y después de la actividad. 2. Párese sobre un pie, roselyn se [place]. Asegúrese de sujetarse al barandal (pasamanos). 3. Lentamente pose susie talones sobre el borde del escalón a medida que relaja los músculos de la pantorrilla.  Debe sentir un leve estiramiento en la parte inferior de brown pie y en la parte de atrás de brown pierna hasta la rodilla. 4. Mantenga trey estiramiento de 15 a 30 segundos y Roseline apriete los músculos de la pantorrilla un poco para llevar el talón hacia atrás hasta el nivel del escalón. Repita de 2 a 4 veces. Flexiones con toalla 1. Estando sentado, coloque el pie sobre charles toalla en el suelo y acerque la toalla hacia usted con los dedos del pie. 2. Luego, usando [employer] dedos del pie, aleje la toalla de usted. 3. Nota: Paramjit trey ejercicio más difícil colocando un objeto pesado, roselyn charles anais de sopa, en el otro extremo de la toalla. Recoger canicas 1. Ponga canicas en el suelo junto a charles taza. 2. Usando los dedos del pie, trate de levantar las canicas del piso y ponerlas en la taza. La atención de seguimiento es charles parte clave de brown tratamiento y seguridad. Asegúrese de hacer y acudir a todas las citas, y llame a brown médico si está teniendo problemas. También es charles buena idea saber los resultados de los exámenes y mantener charles lista de los medicamentos que mercy. Dónde puede encontrar más información en inglés? Cirilo Davies a http://trip-joceline.info/. Reji Ramírez en la búsqueda para aprender más acerca de \"Fascitis plantar: Ejercicios - [ Plantar Fasciitis: Exercises ]. \" 
Revisado: 21 marzo, 2017 Versión del contenido: 11.4 © 9870-5984 Healthwise, Incorporated. Las instrucciones de cuidado fueron adaptadas bajo licencia por [employer] (which disclaims liability or warranty for this information). Si usted tiene [place] afección médica o sobre estas instrucciones, siempre pregunte a brown profesional de nidhi. [employer], Incorporated niega toda garantía o responsabilidad por brown uso de esta información. Body Mass Index: Care Instructions Your Care Instructions Body mass index (BMI) can help you see if your weight is raising your risk for health problems. It uses a formula to compare how much you weigh with how tall you are. · A BMI lower than 18.5 is considered underweight. · A BMI between 18.5 and 24.9 is considered healthy. · A BMI between 25 and 29.9 is considered overweight. A BMI of 30 or higher is considered obese. If your BMI is in the normal range, it means that you have a lower risk for weight-related health problems. If your BMI is in the overweight or obese range, you may be at increased risk for weight-related health problems, such as high blood pressure, heart disease, stroke, arthritis or joint pain, and diabetes. If your BMI is in the underweight range, you may be at increased risk for health problems such as fatigue, lower protection (immunity) against illness, muscle loss, bone loss, hair loss, and hormone problems. BMI is just one measure of your risk for weight-related health problems. You may be at higher risk for health problems if you are not active, you eat an unhealthy diet, or you drink too much alcohol or use tobacco products. Follow-up care is a key part of your treatment and safety. Be sure to make and go to all appointments, and call your doctor if you are having problems. It's also a good idea to know your test results and keep a list of the medicines you take. How can you care for yourself at home? · Practice healthy eating habits. This includes eating plenty of fruits, vegetables, whole grains, lean protein, and low-fat dairy. · If your doctor recommends it, get more exercise. Walking is a good choice. Bit by bit, increase the amount you walk every day. Try for at least 30 minutes on most days of the week. · Do not smoke. Smoking can increase your risk for health problems. If you need help quitting, talk to your doctor about stop-smoking programs and medicines. These can increase your chances of quitting for good. · Limit alcohol to 2 drinks a day for men and 1 drink a day for women.  Too much alcohol can cause health problems. If you have a BMI higher than 25 · Your doctor may do other tests to check your risk for weight-related health problems. This may include measuring the distance around your waist. A waist measurement of more than 40 inches in men or 35 inches in women can increase the risk of weight-related health problems. · Talk with your doctor about steps you can take to stay healthy or improve your health. You may need to make lifestyle changes to lose weight and stay healthy, such as changing your diet and getting regular exercise. If you have a BMI lower than 18.5 · Your doctor may do other tests to check your risk for health problems. · Talk with your doctor about steps you can take to stay healthy or improve your health. You may need to make lifestyle changes to gain or maintain weight and stay healthy, such as getting more healthy foods in your diet and doing exercises to build muscle. Where can you learn more? Go to http://trip-joceline.info/. Enter S176 in the search box to learn more about \"Body Mass Index: Care Instructions. \" Current as of: October 13, 2016 Content Version: 11.4 © 0962-9920 [employer]Tax. Care instructions adapted under license by [employer] (which disclaims liability or warranty for this information). If you have questions about a medical condition or this instruction, always ask your healthcare professional. [address] any warranty or liability for your use of this information. Patient Instructions History Introducing [facility] [place] [facility]! [facility] introduce portal paciente Synapsehart . Ahora se puede acceder a partes de brown expediente médico, enviar por correo electrónico la oficina de brown médico y solicitar renovaciones de medicamentos en línea. En brown navegador de Internet , [place] a [email or web address] Visiont Marixa clic en el usuario por Jose Conte? Saritha Fuentes clic aquí en la sesión Perfecto Lang. Verá la página de registro [place]. Ingrese brown código de Bank [employer] Nirmala kirk y roselyn aparece a continuación. Usted no tendrá que [employer] código después de avis completado el proceso de registro . Si usted no se inscribe antes de la fecha de caducidad , debe solicitar un nuevo código. · MyChart Código de acceso : [id number] Expires: 5/28/2018  4:29 PM 
 
Ingresa los últimos cuatro dígitos de brown Número de Seguro Social ( xxxx ) y fecha de nacimiento ( dd / mm / aaaa ) roselyn se indica y marixa clic en Enviar. Usted será llevado a la siguiente página de registro . Crear un ID MyChart . Esta será brown ID de inicio de sesión de MyChart y no puede ser [place] , por lo que pensar en charles que es [place] y fácil de recordar . Crear charles contraseña MyChart . Usted puede cambiar brown contraseña en cualquier momento . Ingrese brown Password Reset de preguntas y [place] . [place] se puede utilizar en un momento posterior si usted olvida brown contraseña. Introduzca brown dirección de correo electrónico . Hossein Blood recibirá charles notificación por correo electrónico cuando la nueva información está disponible en MyChart . Manoj Holstein clic en Registrarse. [place] Noss anita y descargar porciones de brown expediente médico. 
Marixa clic en el enlace de descarga del menú Resumen para descargar charles copia portátil de brown información médica . Si tiene [employer] , por favor visite la sección de preguntas frecuentes del sitio web MyChart . Recuerde, MyChart NO es que se utilizará para las necesidades urgentes. Para emergencias médicas , llame al 911 . Ahora disponible en brown iPhone y Android ! Por favor proporcione trey resumen de la documentación de cuidado a brown próximo proveedor. Your primary care clinician is listed as [employer] If you have any questions after today's visit, please call [phone].

## 2018-02-27 NOTE — PATIENT INSTRUCTIONS
Fascitis plantar: Instrucciones de cuidado - [ Plantar Fasciitis: Care Instructions ]  Instrucciones de cuidado    La fascitis plantar es un dolor e inflamación de la fascia plantar, el tejido en la parte inferior del pie que conecta el hueso del talón a los dedos del pie. La fascia plantar también sostiene el arco. Si tensiona la fascia plantar, puede desarrollar pequeños desgarros y causar dolor en el talón al levantarse o al caminar. La fascitis plantar puede ser ocasionada por correr o practicar otros deportes. También puede presentarse en personas con sobrepeso o que tienen whitney altos o pies planos. Usted puede tener fascitis plantar si camina o permanece parado [employer], o tiene un tendón de Rene tenso o músculos de la pantorrilla rígidos. Puede mejorar brown dolor de pie con descanso y [de-identified] cuidados en el [facility]. Podría llevar unas cuantas semanas a algunos meses para que brown pie sane completamente. La atención de seguimiento es charles parte clave de brown tratamiento y seguridad. Asegúrese de hacer y acudir a todas las citas, y llame a brown médico si está teniendo problemas. También es charles buena idea saber los resultados de los exámenes y mantener charles lista de los medicamentos que mercy. Cómo puede cuidarse en el [facility]? · Descanse brown pie a menudo. Reduzca brown actividad hasta un nivel que le permita evitar el dolor. Si es posible, no corra ni camine sobre superficies duras. · [employer] analgésicos (medicamentos para el dolor) exactamente según las indicaciones. ¨ Si el médico le recetó un analgésico, tómelo según las indicaciones. ¨ Si no está tomando un analgésico recetado, tómese un antiinflamatorio de venta tasha para el dolor y la hinchazón, roselyn ibuprofeno (Advil, Motrin) o naproxeno (Aleve). Nuria y siga todas las instrucciones de la [place]. · Aplíquese masajes con hielo para ayudar con el dolor y la hinchazón. Puede utilizar cubitos de hielo o charles copa de hielo varias veces al día.  Para hacer charles copa de hielo, llene charles taza de papel con agua y congélela. Stew la parte superior de la taza hasta que sobresalga media pulgada (1.25 cm) de hielo. Sosténgala por la parte remanente de papel para utilizar la copa. Frote el hielo en círculos pequeños sobre la katya gina 5 a 7 minutos. · Los kari alternados con [place] y agua fría también pueden ayudar a disminuir la hinchazón. Husam roselyn el calor solo podría empeorar el dolor y la hinchazón, finalice un baño de contraste sumergiendo el pie en agua fría. · Use charles tablilla (férula) nocturna si brown médico se lo sugiere. Charles tablilla nocturna mantiene el pie con los dedos apuntando Lloyd y el pie y el tobillo a un ángulo de 90 grados. Esta posición le proporciona un estiramiento suave y nohemi a la parte inferior del pie. · Paramjit ejercicios simples roselyn estiramientos de la pantorrilla y estiramientos con charles toalla 2 a 3 veces al día, especialmente al [employer]. Éstos pueden ayudar a la fascia plantar a volverse más flexible. También hacen que se fortalezcan los músculos que soportan el arco. Mantenga estos estiramientos gina 15 a 30 segundos cada vez. Repítalos 2 a 4 veces. ¨ Párese a 1 pie (31 cm) de la pared. Coloque las jai de ambas adriana contra la pared a la altura del pecho. Inclínese hacia adelante contra la pared, mantenga charles pierna con la rodilla recta y el talón en el suelo mientras dobla la rodilla de la otra pierna. ¨ Siéntese en el suelo o en charles colchoneta con los pies estirados al frente. Enrolle charles toalla a lo mike y colóquela alrededor de la parte anterior de la planta de chano de los pies. Sosteniendo la toalla por ambos extremos, jálela suavemente hacia usted para estirar brown pie. · Use zapatos con buen soporte para el arco. El calzado deportivo o los zapatos con la suela laya acolchada son charles Darcus Brisk. · Pruebe con cuñas o plantillas (ortóticos) para ayudar a amortiguar el talón.  Se pueden comprar en muchas tiendas de calzado. · Póngase los zapatos tan pronto se levante de la cama. Andar descalzo o usar pantuflas podría empeorar el dolor. · Alcance un buen peso de acuerdo a brown estatura, y [place]. [place] christian la carga de los pies. Cuándo debe pedir ayuda? Llame a brown médico ahora mismo o busque atención médica inmediata si:  ? · Tiene dolor en el talón con fiebre, enrojecimiento o calor. ? · No puede cargar [employer] adolorido. ?Preste especial atención a los cambios en brown nidhi y asegúrese de comunicarse con brown médico si:  ? · Siente hormigueo o entumecimiento en el talón. ? · El dolor en el talón dura más de 2 semanas. Dónde puede encontrar más información en inglés? Bruce Jones a http://trip-joceline.info/. Cesia Jiang M189 en la búsqueda para aprender más acerca de \"Fascitis plantar: Instrucciones de cuidado - [ Plantar Fasciitis: Care Instructions ]. \"  Revisado: 21 marzo, 2017  Versión del contenido: 11.4  © 5864-5625 Healthwise, Incorporated. Las instrucciones de cuidado fueron adaptadas bajo licencia por [employer] (which disclaims liability or warranty for this information). Si usted tiene [place] afección médica o sobre estas instrucciones, siempre pregunte a brown profesional de nidhi. Healthwise, Incorporated niega toda garantía o responsabilidad por brown uso de esta información. Fascitis plantar: Ejercicios - [ Plantar Fasciitis: Exercises ]  Instrucciones de cuidado  Aquí se presentan algunos ejemplos de ejercicios típicos de rehabilitación para tratar brown afección. Empiece cada ejercicio lentamente. Reduzca la intensidad del ejercicio si Socorro Christine a tener dolor. Brown médico o fisioterapeuta le dirán cuándo puede comenzar con estos ejercicios y cuáles funcionarán mejor para usted. Cómo hacer los ejercicios  Estiramiento con toalla    1. Siéntese con las piernas extendidas y las rodillas rectas.   2. Coloque charles toalla alrededor de brown pie xenia por debajo de los dedos. 3. Sostenga cada extremo de la toalla con cada mano, con las adriana por encima de las rodillas. 4. Jale hacia atrás con la toalla para que el pie se extienda hacia usted. 5. Mantenga la posición por lo menos de 15 a 30 segundos. 6. Repita 2 a 4 veces por sesión, hasta 5 veces al día. Estiramiento de pantorrilla    1. Nota: Con trey ejercicio se estiran los músculos traseros de la parte inferior de la pierna (la pantorrilla) y el tendón de Rene. Paramjit trey ejercicio 3 o 4 veces al día, 5 días a la semana. 2. Póngase de pie frente a charles pared, con las adriana apoyadas en la pared a la altura de los ojos. Ponga la pierna que [place] a estirar un paso atrás de la [place]. 3. Manteniendo colin talón en el suelo, doble la pierna de adelante hasta que sienta el estiramiento en la pierna de atrás. 4. Sostenga el estiramiento de 15 a 30 segundos. Repita de 2 a 4 veces. Estiramiento de la fascia plantar y la pantorrilla    1. Nota: El estiramiento de la fascia plantar y los músculos de la pantorrilla puede aumentar la flexibilidad y disminuir el dolor del talón. Puede hacer trey ejercicio varias veces al día antes y después de la actividad. 2. Párese sobre un pie, roselyn se [place]. Asegúrese de sujetarse al barandal (pasamanos). 3. Lentamente pose susie talones sobre el borde del escalón a medida que relaja los músculos de la pantorrilla. Debe sentir un leve estiramiento en la parte inferior de brown pie y en la parte de atrás de brown pierna hasta la rodilla. 4. Mantenga trey estiramiento de 15 a 30 segundos y Roseline apriete los músculos de la pantorrilla un poco para llevar el talón hacia atrás hasta el nivel del escalón. Repita de 2 a 4 veces. Flexiones con toalla    1. Estando sentado, coloque el pie sobre charles toalla en el suelo y acerque la toalla hacia usted con los dedos del pie. 2. Luego, usando [employer] dedos del pie, aleje la toalla de usted.   3. Nota: Revonda Hollie ejercicio más difícil colocando un objeto pesado, roselyn charles anais de sopa, en el otro extremo de la toalla. Recoger canicas    1. Ponga canicas en el suelo junto a charles taza. 2. Usando los dedos del pie, trate de levantar las canicas del piso y ponerlas en la taza. La atención de seguimiento es charles parte clave de brown tratamiento y seguridad. Asegúrese de hacer y acudir a todas las citas, y llame a brown médico si está teniendo problemas. También es charles buena idea saber los resultados de los exámenes y mantener charles lista de los medicamentos que mercy. Dónde puede encontrar más información en inglés? Bernice Galeazzi a http://trip-joceline.info/. Unique Marquez en la búsqueda para aprender más acerca de \"Fascitis plantar: Ejercicios - [ Plantar Fasciitis: Exercises ]. \"  Revisado: 21 marzo, 2017  Versión del contenido: 11.4  © 9256-4067 Healthwise, Incorporated. Las instrucciones de cuidado fueron adaptadas bajo licencia por [employer] (which disclaims liability or warranty for this information). Si usted tiene [place] afección médica o sobre estas instrucciones, siempre pregunte a brown profesional de nidhi. Healthwise, Incorporated niega toda garantía o responsabilidad por brown uso de esta información. Body Mass Index: Care Instructions  Your Care Instructions    Body mass index (BMI) can help you see if your weight is raising your risk for health problems. It uses a formula to compare how much you weigh with how tall you are. · A BMI lower than 18.5 is considered underweight. · A BMI between 18.5 and 24.9 is considered healthy. · A BMI between 25 and 29.9 is considered overweight. A BMI of 30 or higher is considered obese. If your BMI is in the normal range, it means that you have a lower risk for weight-related health problems.  If your BMI is in the overweight or obese range, you may be at increased risk for weight-related health problems, such as high blood pressure, heart disease, stroke, arthritis or joint pain, and diabetes. If your BMI is in the underweight range, you may be at increased risk for health problems such as fatigue, lower protection (immunity) against illness, muscle loss, bone loss, hair loss, and hormone problems. BMI is just one measure of your risk for weight-related health problems. You may be at higher risk for health problems if you are not active, you eat an unhealthy diet, or you drink too much alcohol or use tobacco products. Follow-up care is a key part of your treatment and safety. Be sure to make and go to all appointments, and call your doctor if you are having problems. It's also a good idea to know your test results and keep a list of the medicines you take. How can you care for yourself at home? · Practice healthy eating habits. This includes eating plenty of fruits, vegetables, whole grains, lean protein, and low-fat dairy. · If your doctor recommends it, get more exercise. Walking is a good choice. Bit by bit, increase the amount you walk every day. Try for at least 30 minutes on most days of the week. · Do not smoke. Smoking can increase your risk for health problems. If you need help quitting, talk to your doctor about stop-smoking programs and medicines. These can increase your chances of quitting for good. · Limit alcohol to 2 drinks a day for men and 1 drink a day for women. Too much alcohol can cause health problems. If you have a BMI higher than 25  · Your doctor may do other tests to check your risk for weight-related health problems. This may include measuring the distance around your waist. A waist measurement of more than 40 inches in men or 35 inches in women can increase the risk of weight-related health problems. · Talk with your doctor about steps you can take to stay healthy or improve your health.  You may need to make lifestyle changes to lose weight and stay healthy, such as changing your diet and getting regular exercise. If you have a BMI lower than 18.5  · Your doctor may do other tests to check your risk for health problems. · Talk with your doctor about steps you can take to stay healthy or improve your health. You may need to make lifestyle changes to gain or maintain weight and stay healthy, such as getting more healthy foods in your diet and doing exercises to build muscle. Where can you learn more? Go to http://trip-joceline.info/. Enter S176 in the search box to learn more about \"Body Mass Index: Care Instructions. \"  Current as of: October 13, 2016  Content Version: 11.4  © 7522-5505 [employer]. Care instructions adapted under license by [employer] (which disclaims liability or warranty for this information). If you have questions about a medical condition or this instruction, always ask your healthcare professional. [address] any warranty or liability for your use of this information.

## 2018-02-27 NOTE — LETTER
3/21/2018 11:00 AM 
 
Ms. Soni Pacheco[address] [facility] [address] Dear Soni Ramirez: 
 
Please find your most recent results below. Resulted Orders METABOLIC PANEL, BASIC Result Value Ref Range Glucose 112 (H) 65 - 99 mg/dL BUN 15 6 - 24 mg/dL Creatinine 0.54 (L) 0.57 - 1.00 mg/dL GFR est non- >59 mL/min/1.73 GFR est  >59 mL/min/1.73  
 BUN/Creatinine ratio 28 (H) 9 - 23 Sodium 143 134 - 144 mmol/L Potassium 4.1 3.5 - 5.2 mmol/L Chloride 104 96 - 106 mmol/L  
 CO2 26 18 - 29 mmol/L Calcium 9.2 8.7 - 10.2 mg/dL Narrative Performed at:  [address]  [id number] : Claudette Ziegler MD, Phone:  [phone] LIPID PANEL Result Value Ref Range Cholesterol, total 190 100 - 199 mg/dL Triglyceride 116 0 - 149 mg/dL HDL Cholesterol 58 >39 mg/dL VLDL, calculated 23 5 - 40 mg/dL LDL, calculated 109 (H) 0 - 99 mg/dL Narrative Performed at:  [address]  [id number] : Claudette Ziegler MD, Phone:  [phone] HEMOGLOBIN A1C WITH EAG Result Value Ref Range Hemoglobin A1c 5.6 4.8 - 5.6 % Comment:  
            Pre-diabetes: 5.7 - 6.4 Diabetes: >6.4 Glycemic control for adults with diabetes: <7.0 Estimated average glucose 114 mg/dL Narrative Performed at:  [address]  [id number] : Claudette Ziegler MD, Phone:  [phone] RECOMMENDATIONS: 
 
Excellent diabetes control. Cholesterol improved. Good job. Please call me if you have any questions: [phone] Sincerely, 
 
 
[place] MD

## 2018-02-27 NOTE — PROGRESS NOTES
Ms. Guero Montalvo is a 47y.o. year old female who had concerns including Diabetes. HPI:  Chief Complaint   Patient presents with    Diabetes     rm 6 here to follow up     No polyuria, polydipsia, polyphyagia  Lab Results   Component Value Date/Time    Hemoglobin A1c 6.0 (H) 08/02/2017 11:40 AM     + plantar fasciitis. Pt received injection, but minimal relief. She is wearing the soft night splint, but not notiing much relief. Exercising 3x per week    Wt Readings from Last 3 Encounters:   02/27/18 159 lb (72.1 kg)   09/26/17 156 lb 11.2 oz (71.1 kg)   08/29/17 156 lb 3.2 oz (70.9 kg)       Past Medical History:   Diagnosis Date    Degenerative arthritis of knee, bilateral     seen by ortho and injection    H/O mammogram 2012    normal    Pap smear for cervical cancer screening 4/2013    normal     Current Outpatient Prescriptions   Medication Sig Dispense    metFORMIN (GLUCOPHAGE) 500 mg tablet Take 1 Tab by mouth daily (with breakfast). 90 Tab     No current facility-administered medications for this visit. Reviewed PmHx, RxHx, FmHx, SocHx, AllgHx and updated and dated in the chart. ROS: Negative except for BOLD  General: fever, chills, fatigue  Respiratory: cough, SOB, wheezing  Cardiovascular:  CP, palpitation, CANTU, edema   Gastrointestinal: N/V/D, bleeding  Genito-Urinary: dysuria, hematuria  Musculoskeletal: muscle weakness, pain, swelling    OBJECTIVE:   Visit Vitals    /74 (BP 1 Location: Right arm, BP Patient Position: Sitting)    Pulse 77    Temp 97.9 °F (36.6 °C) (Oral)    Resp 18    Ht 5' (1.524 m)    Wt 159 lb (72.1 kg)    LMP 02/06/2018 (Approximate)    SpO2 96%    BMI 31.05 kg/m2     GEN: The patient appears well, alert, oriented x 3, in no distress. ENT: bilateral TM and canal normal.  Neck supple. No adenopathy or thyromegaly. JUAN. Lungs: clear bilaterally, good air entry, no wheezes, rhonchi or rales. Cardiovascular: regular rate and rhythm.  S1 and S2 normal, no murmurs,  Abdomen: + BS, soft without tenderness, guarding, rebound, mass or organomegaly. Extremities: no edema, normal peripheral pulses. Neurological: normal, gross sensory and motor in tact without focal findings. Bilateral heel pain, tender. Results for orders placed or performed in visit on 09/26/17   AMB POC RAPID STREP A   Result Value Ref Range    VALID INTERNAL CONTROL POC Yes     Group A Strep Ag Positive Negative         Assessment/ Plan:       ICD-10-CM ICD-9-CM    1. Prediabetes H53.32 537.62 METABOLIC PANEL, BASIC      LIPID PANEL      COLLECTION VENOUS BLOOD,VENIPUNCTURE      HEMOGLOBIN A1C WITH EAG   2. Mixed hyperlipidemia A80.5 047.7 METABOLIC PANEL, BASIC      LIPID PANEL      COLLECTION VENOUS BLOOD,VENIPUNCTURE      HEMOGLOBIN A1C WITH EAG   3. BMI 31.0-31.9,adult C80.36 Z93.82 METABOLIC PANEL, BASIC      LIPID PANEL      COLLECTION VENOUS BLOOD,VENIPUNCTURE      HEMOGLOBIN A1C WITH EAG           I have discussed the diagnosis with the patient and the intended plan as seen in the above orders. The patient has received an after-visit summary and questions were answered concerning future plans. Medication Side Effects and Warnings were discussed with patient. Follow-up Disposition:  Return in about 2 weeks (around 3/13/2018) for CPE. Venita Ca MD      Discussed the patient's BMI with her. The BMI follow up plan is as follows:     dietary management education, guidance, and counseling  encourage exercise  monitor weight  prescribed dietary intake    An After Visit Summary was printed and given to the patient.

## 2018-03-01 LAB
BUN SERPL-MCNC: 15 MG/DL (ref 6–24)
BUN/CREAT SERPL: 28 (ref 9–23)
CALCIUM SERPL-MCNC: 9.2 MG/DL (ref 8.7–10.2)
CHLORIDE SERPL-SCNC: 104 MMOL/L (ref 96–106)
CHOLEST SERPL-MCNC: 190 MG/DL (ref 100–199)
CO2 SERPL-SCNC: 26 MMOL/L (ref 18–29)
CREAT SERPL-MCNC: 0.54 MG/DL (ref 0.57–1)
EST. AVERAGE GLUCOSE BLD GHB EST-MCNC: 114 MG/DL
GFR SERPLBLD CREATININE-BSD FMLA CKD-EPI: 109 ML/MIN/1.73
GFR SERPLBLD CREATININE-BSD FMLA CKD-EPI: 125 ML/MIN/1.73
GLUCOSE SERPL-MCNC: 112 MG/DL (ref 65–99)
HBA1C MFR BLD: 5.6 % (ref 4.8–5.6)
HDLC SERPL-MCNC: 58 MG/DL
LDLC SERPL CALC-MCNC: 109 MG/DL (ref 0–99)
POTASSIUM SERPL-SCNC: 4.1 MMOL/L (ref 3.5–5.2)
SODIUM SERPL-SCNC: 143 MMOL/L (ref 134–144)
TRIGL SERPL-MCNC: 116 MG/DL (ref 0–149)
VLDLC SERPL CALC-MCNC: 23 MG/DL (ref 5–40)

## 2018-04-03 ENCOUNTER — OFFICE VISIT (OUTPATIENT)
Dept: INTERNAL MEDICINE CLINIC | Age: 53
End: 2018-04-03

## 2018-04-03 VITALS — BODY MASS INDEX: 32.2 KG/M2 | HEIGHT: 60 IN | WEIGHT: 164 LBS

## 2018-04-03 DIAGNOSIS — Z12.39 SCREENING FOR BREAST CANCER: ICD-10-CM

## 2018-04-03 DIAGNOSIS — Z12.4 SCREENING FOR CERVICAL CANCER: ICD-10-CM

## 2018-04-03 DIAGNOSIS — R23.2 HOT FLASHES: ICD-10-CM

## 2018-04-03 DIAGNOSIS — Z01.419 WELL WOMAN EXAM WITH ROUTINE GYNECOLOGICAL EXAM: ICD-10-CM

## 2018-04-03 DIAGNOSIS — Z12.11 SCREEN FOR COLON CANCER: ICD-10-CM

## 2018-04-03 DIAGNOSIS — Z00.8 ENCOUNTER FOR BIOMETRIC SCREENING: ICD-10-CM

## 2018-04-03 DIAGNOSIS — Z01.419 WELL WOMAN EXAM: Primary | ICD-10-CM

## 2018-04-03 DIAGNOSIS — E11.9 DIABETES MELLITUS TYPE 2, DIET-CONTROLLED (HCC): ICD-10-CM

## 2018-04-03 RX ORDER — PAROXETINE HYDROCHLORIDE 40 MG/1
40 TABLET, FILM COATED ORAL DAILY
Qty: 60 TAB | Refills: 5 | Status: SHIPPED | OUTPATIENT
Start: 2018-04-03

## 2018-04-03 NOTE — PROGRESS NOTES
Ms. Jyoti Jane is a 47y.o. year old female who had concerns including Well Woman. HPI:  Chief Complaint   Patient presents with    Well Woman     last CPE unknown     Hot flashes restarted. Still undergoing treatment for plantar fasciitis. Not better really. In PT and followed by foot specialist.     Past Medical History:   Diagnosis Date    Degenerative arthritis of knee, bilateral     seen by ortho and injection    H/O mammogram 2012    normal    Pap smear for cervical cancer screening 4/2013    normal     Current Outpatient Prescriptions   Medication Sig Dispense    metFORMIN (GLUCOPHAGE) 500 mg tablet Take 1 Tab by mouth daily (with breakfast). 90 Tab     No current facility-administered medications for this visit. Reviewed PmHx, RxHx, FmHx, SocHx, AllgHx and updated and dated in the chart. ROS: Negative except for BOLD  General: fever, chills, fatigue  Respiratory: cough, SOB, wheezing  Cardiovascular:  CP, palpitation, CANTU, edema   Gastrointestinal: N/V/D, bleeding  Genito-Urinary: dysuria, hematuria  Musculoskeletal: muscle weakness, pain, swelling    OBJECTIVE:   Visit Vitals    Ht 5' (1.524 m)    Wt 164 lb (74.4 kg)    BMI 32.03 kg/m2     GEN: The patient appears well, alert, oriented x 3, in no distress. ENT: bilateral TM and canal normal.  Neck supple. No adenopathy or thyromegaly. JUAN. Lungs: clear bilaterally, good air entry, no wheezes, rhonchi or rales. Cardiovascular: regular rate and rhythm. S1 and S2 normal, no murmurs,  Abdomen: + BS, soft without tenderness, guarding, rebound, mass or organomegaly. Extremities: no edema, normal peripheral pulses. Neurological: normal, gross sensory and motor in tact without focal findings. Breasts: breasts appear normal, no suspicious masses, no skin or nipple changes or axillary nodes. Pelvic exam: normal external genitalia, vulva, vagina, cervix, uterus and adnexa, PAP: Pap smear done today.   Waist: [address] [facility]      Results for orders placed or performed in visit on 48/72/57   METABOLIC PANEL, BASIC   Result Value Ref Range    Glucose 112 (H) 65 - 99 mg/dL    BUN 15 6 - 24 mg/dL    Creatinine 0.54 (L) 0.57 - 1.00 mg/dL    GFR est non- >59 mL/min/1.73    GFR est  >59 mL/min/1.73    BUN/Creatinine ratio 28 (H) 9 - 23    Sodium 143 134 - 144 mmol/L    Potassium 4.1 3.5 - 5.2 mmol/L    Chloride 104 96 - 106 mmol/L    CO2 26 18 - 29 mmol/L    Calcium 9.2 8.7 - 10.2 mg/dL   LIPID PANEL   Result Value Ref Range    Cholesterol, total 190 100 - 199 mg/dL    Triglyceride 116 0 - 149 mg/dL    HDL Cholesterol 58 >39 mg/dL    VLDL, calculated 23 5 - 40 mg/dL    LDL, calculated 109 (H) 0 - 99 mg/dL   HEMOGLOBIN A1C WITH EAG   Result Value Ref Range    Hemoglobin A1c 5.6 4.8 - 5.6 %    Estimated average glucose 114 mg/dL         Assessment/ Plan:       ICD-10-CM ICD-9-CM    1. Well woman exam Z01.419 V72.31 MARJAN MAMMO BI SCREENING INCL CAD      PAP IG, HPV RFX HPV 16/18,45      REFERRAL FOR COLONOSCOPY   2. Well woman exam with routine gynecological exam Z0.5 V72.31 MARJAN MAMMO BI SCREENING INCL CAD      PAP IG, HPV RFX HPV 16/18,45      REFERRAL FOR COLONOSCOPY   3. Screening for cervical cancer Z12.4 V76.2 PAP IG, HPV RFX HPV 16/18,45   4. Screening for breast cancer Z12.31 V76.10 [facility] MAMMO BI SCREENING INCL CAD   5. BMI 32.0-32.9,adult Z68.32 V85.32    6. Diabetes mellitus type 2, diet-controlled (HCC) E11.9 250.00    7. Encounter for biometric screening Z01.89 V72.85    8. Screen for colon cancer Z12.11 V76.51 REFERRAL FOR COLONOSCOPY       Weight loss advised. Mammogram and colonscopy overdue    I have discussed the diagnosis with the patient and the intended plan as seen in the above orders. The patient has received an after-visit summary and questions were answered concerning future plans. Medication Side Effects and Warnings were discussed with patient.     Follow-up Disposition:  Return for Annual Exam, yearly.       Ryan Berman MD  =

## 2018-04-03 NOTE — PATIENT INSTRUCTIONS
Fascitis plantar: Ejercicios - [ Plantar Fasciitis: Exercises ]  Instrucciones de cuidado  Aquí se presentan algunos ejemplos de ejercicios típicos de rehabilitación para tratar brown afección. Empiece cada ejercicio lentamente. Reduzca la intensidad del ejercicio si Mercedes Calamity a tener dolor. Brown médico o fisioterapeuta le dirán cuándo puede comenzar con estos ejercicios y cuáles funcionarán mejor para usted. Cómo hacer los ejercicios  Estiramiento con toalla    1. Siéntese con las piernas extendidas y las rodillas rectas. 2. Coloque charles toalla alrededor de brown pie xenia por debajo de los dedos. 3. Sostenga cada extremo de la toalla con cada mano, con las adriana por encima de las rodillas. 4. Jale hacia atrás con la toalla para que el pie se extienda hacia usted. 5. Mantenga la posición por lo menos de 15 a 30 segundos. 6. Repita 2 a 4 veces por sesión, hasta 5 veces al día. Estiramiento de pantorrilla    1. Nota: Con trey ejercicio se estiran los músculos traseros de la parte inferior de la pierna (la pantorrilla) y el tendón de Rene. Paramjit trey ejercicio 3 o 4 veces al día, 5 días a la semana. 2. Póngase de pie frente a charles pared, con las adriana apoyadas en la pared a la altura de los ojos. Ponga la pierna que Alida Buckle a estirar un paso atrás de la Arment[place]. 3. Manteniendo colin talón en el suelo, doble la pierna de adelante hasta que sienta el estiramiento en la pierna de atrás. 4. Sostenga el estiramiento de 15 a 30 segundos. Repita de 2 a 4 veces. Estiramiento de la fascia plantar y la pantorrilla    1. Nota: El estiramiento de la fascia plantar y los músculos de la pantorrilla puede aumentar la flexibilidad y disminuir el dolor del talón. Puede hacer trey ejercicio varias veces al día antes y después de la actividad. 2. Párese sobre un pie, roselyn se [place]. Asegúrese de sujetarse al barandal (pasamanos).   3. Lentamente pose susie talones sobre el borde del escalón a medida que relaja los músculos de la pantorrilla. Debe sentir un leve estiramiento en la parte inferior de brown pie y en la parte de atrás de brown pierna hasta la rodilla. 4. Mantenga trey estiramiento de 15 a 30 segundos y Roseline apriete los músculos de la pantorrilla un poco para llevar el talón hacia atrás hasta el nivel del escalón. Repita de 2 a 4 veces. Flexiones con toalla    1. Estando sentado, coloque el pie sobre charles toalla en el suelo y acerque la toalla hacia usted con los dedos del pie. 2. Luego, usando [employer] dedos del pie, aleje la toalla de usted. 3. Nota: Paramjit trey ejercicio más difícil colocando un objeto pesado, roselyn charles anais de sopa, en el otro extremo de la toalla. Recoger canicas    1. Ponga canicas en el suelo junto a charles taza. 2. Usando los dedos del pie, trate de levantar las canicas del piso y ponerlas en la taza. La atención de seguimiento es charles parte clave de brown tratamiento y seguridad. Asegúrese de hacer y acudir a todas las citas, y llame a brown médico si está teniendo problemas. También es charles buena idea saber los resultados de los exámenes y mantener charles lista de los medicamentos que mercy. ¿Dónde puede encontrar más información en inglés? Godfrey Late a http://trip-joceline.info/. Kobi Go en la búsqueda para aprender más acerca de \"Fascitis plantar: Ejercicios - [ Plantar Fasciitis: Exercises ]. \"  Revisado: 21 marzo, 2017  Versión del contenido: 11.4  © 9995-1085 Healthwise, Incorporated. Las instrucciones de cuidado fueron adaptadas bajo licencia por Good [employer] (which disclaims liability or warranty for this information). Si usted tiene [place] afección médica o sobre estas instrucciones, siempre pregunte a brown profesional de nidhi. [facility] niega toda garantía o responsabilidad por brown uso de esta información.

## 2018-04-03 NOTE — MR AVS SNAPSHOT
[address] [place] [address] [id number] 
[phone] Patient: Jono Tom MRN: [id number] :1965 Visit Information [place] Personal Médico Departamento Teléfono del Dep. Número de visita 4/3/2018  3:45 PM Denice Segovia  [facility] and [place] [address] [phone] Follow-up Instructions Return for Annual Exam, yearly. Upcoming Health Maintenance Date Due  
 BREAST CANCER SCRN MAMMOGRAM 10/21/2017 FOBT Q 1 YEAR AGE 50-75 2018 PAP AKA CERVICAL CYTOLOGY 2018 DTaP/Tdap/Td series (2 - Td) [address]  Review Complete El: 4/3/2018 Por: Aubrie  A partir del:  4/3/2018 No Known Allergies Vacunas actuales Merrianne Landers No hay ninguna vacuna archivada. No revisadas esta visita You Were Diagnosed With   
  
 Sheldon Held Well woman exam    -  Primary ICD-10-CM: O62.611 ICD-9-CM: V72.31 Well woman exam with routine gynecological exam     ICD-10-CM: R42.005 ICD-9-CM: V72.31 Screening for cervical cancer     ICD-10-CM: Z12.4 ICD-9-CM: V76.2 Screening for breast cancer     ICD-10-CM: Z12.31 
ICD-9-CM: V76.10 BMI 32.0-32.9,adult     ICD-10-CM: X46.47 
ICD-9-CM: V85.32 Diabetes mellitus type 2, diet-controlled ([facility] [address]     ICD-10-CM: E11.9 ICD-9-CM: 250.00 Encounter for biometric screening     ICD-10-CM: Z01.89 ICD-9-CM: V72.85 Screen for colon cancer     ICD-10-CM: Z12.11 ICD-9-CM: V76.51 Hot flashes     ICD-10-CM: R23.2 ICD-9-CM: 782.62 Partes vitales [place] ( percentil de crecimiento) Peso (percentil de crecimiento) [facility]) Estado obstétrico Estatus de tabaquísmo 5' (1.524 m) 164 lb (74.4 kg) 32.03 kg/m2 Having regular periods Never Smoker Historial de signos vitales BMI and BSA Data Body Mass Index Body Surface Area  32.03 kg/m 2 1.77 m 2  
  
  
 [facility] Name Phone Darrin Castillo [address] [place], [address] [facility] [phone] Kaplan lista de medicamentos actualizada Manish Alcantar actualizada 4/3/18  4:39 PM.  Nico Membreno use kaplan lista de medicamentos más reciente. metFORMIN 500 mg tablet También conocido lexi:  GLUCOPHAGE Take 1 Tab by mouth daily (with breakfast). Hicimos lo siguiente PAP IG, HPV RFX HPV 16/18,45 [[id number] Custom] REFERRAL FOR COLONOSCOPY [[id number] Custom] Comentarios:  
 Please evaluate patient for screening colonoscopy. Instrucciones de seguimiento Return for Annual Exam, yearly. Por hacer 04/03/2018 Imaging:  MARJAN MAMMO BI SCREENING INCL CAD Informacion de [employer] de Referencia Referido por Referido a  
  
 [id number] [address], [address] [facility] [address] Phone: [phone] Fax: [phone] Visitas Estado Fecha de inicio [place] final  
 1 New Request 4/3/18 4/3/19 Si kaplan referencia tiene un estado de \"pending review\" o \"denied\" , informacion adicional sera enviada para apoyar el resultado de esta decision. Instrucciones para el Paciente Fascitis plantar: Ejercicios - [ Plantar Fasciitis: Exercises ] Instrucciones de cuidado Aquí se presentan algunos ejemplos de ejercicios típicos de rehabilitación para tratar kaplan afección. Empiece cada ejercicio lentamente. Reduzca la intensidad del ejercicio si Dandy Lexi a tener dolor. Kaplan médico o fisioterapeuta le dirán cuándo puede comenzar con estos ejercicios y cuáles funcionarán mejor para usted. Cómo hacer los ejercicios Estiramiento con toalla 1. Siéntese con las piernas extendidas y las rodillas rectas. 2. Coloque charles toalla alrededor de kaplan pie xenia por debajo de los dedos.  
3. Sostenga cada extremo de la toalla con cada mano, con las adriana por encima de las rodillas. 4. Jale hacia atrás con la toalla para que el pie se extienda hacia usted. 5. Mantenga la posición por lo menos de 15 a 30 segundos. 6. Repita 2 a 4 veces por sesión, hasta 5 veces al día. Estiramiento de pantorrilla 1. Nota: Con trey ejercicio se estiran los músculos traseros de la parte inferior de la pierna (la pantorrilla) y el tendón de Rene. Paramjit trey ejercicio 3 o 4 veces al día, 5 días a la semana. 2. Póngase de pie frente a charles pared, con las adriana apoyadas en la pared a la altura de los ojos. Ponga la pierna que Bonnee Gemma a estirar un paso atrás de la Armenta. 3. Manteniendo colin talón en el suelo, doble la pierna de adelante hasta que sienta el estiramiento en la pierna de atrás. 4. Sostenga el estiramiento de 15 a 30 segundos. Repita de 2 a 4 veces. Estiramiento de la fascia plantar y la pantorrilla 1. Nota: El estiramiento de la fascia plantar y los músculos de la pantorrilla puede aumentar la flexibilidad y disminuir el dolor del talón. Puede hacer trey ejercicio varias veces al día antes y después de la actividad. 2. Párese sobre un pie, roselyn se [place]. Asegúrese de sujetarse al barandal (pasamanos). 3. Lentamente pose susie talones sobre el borde del escalón a medida que relaja los músculos de la pantorrilla. Debe sentir un leve estiramiento en la parte inferior de brown pie y en la parte de atrás de brown pierna hasta la rodilla. 4. Mantenga trey estiramiento de 15 a 30 segundos y Roseline apriete los músculos de la pantorrilla un poco para llevar el talón hacia atrás hasta el nivel del escalón. Repita de 2 a 4 veces. Flexiones con toalla 1. Estando sentado, coloque el pie sobre charles toalla en el suelo y acerque la toalla hacia usted con los dedos del pie. 2. Luego, usando [employer] dedos del pie, aleje la toalla de usted. 3. Nota: Paramjit trey ejercicio más difícil colocando un objeto pesado, roselyn charles anais de sopa, en el otro extremo de la toalla. Recoger canicas 1. Ponga canicas en el suelo junto a charles taza. 2. Usando los dedos del pie, trate de levantar las canicas del piso y ponerlas en la taza. La atención de seguimiento es charles parte clave de brown tratamiento y seguridad. Asegúrese de hacer y acudir a todas las citas, y llame a brown médico si está teniendo problemas. También es charles buena idea saber los resultados de los exámenes y mantener charles lista de los medicamentos que mercy. Dónde puede encontrar más información en inglés? Minor Cordia a http://trip-joceline.info/. Junious Paci en la búsqueda para aprender más acerca de \"Fascitis plantar: Ejercicios - [ Plantar Fasciitis: Exercises ]. \" 
Revisado: 21 marzo, 2017 Versión del contenido: 11.4 © 7421-1191 Healthwise, Incorporated. Las instrucciones de cuidado fueron adaptadas bajo licencia por Good [employer] (which disclaims liability or warranty for this information). Si usted tiene [place] afección médica o sobre estas instrucciones, siempre pregunte a brown profesional de nidhi. Healthwise, Incorporated niega toda garantía o responsabilidad por brown uso de esta información. Introducing [facility]! [facility] introduce portal paciente MyCharjackeline . Ahora se puede acceder a partes de brown expediente médico, enviar por correo electrónico la oficina de brown médico y solicitar renovaciones de medicamentos en línea. En brown navegador de Internet , Aliyah Radford a [email or web address] Paramjit clic en el usuario por Ana Rosa Sermon? Leandrew Pallas clic aquí en la sesión Jaya Fabian. Verá la página de registro Ralph. Ingrese brown código de [employer] kirk y roselyn aparece a continuación. Usted no tendrá que [employer] código después de avis completado el proceso de registro . Si usted no se inscribe antes de la fecha de caducidad , debe solicitar un nuevo código. · MyChart Código de acceso : [id number] Expires: 5/28/2018  5:29 PM 
 
 Ingresa los últimos cuatro dígitos de brown Número de Seguro Social ( xxxx ) y fecha de nacimiento ( dd / mm / aaaa ) roselyn se indica y paramjit clic en Enviar. Usted será llevado a la siguiente página de registro . Crear un ID MyChart . Esta será brown ID de inicio de sesión de MyChart y no puede ser [place] , por lo que pensar en charles que es Dicky Falk y fácil de recordar . Crear charles contraseña MyChart . Usted puede cambiar brown contraseña en cualquier momento . Ingrese brown Password Reset de preguntas y [place] . [place] se puede utilizar en un momento posterior si usted olvida brown contraseña. Introduzca brown dirección de correo electrónico . Binh Biswas recibirá charles notificación por correo electrónico cuando la nueva información está disponible en MyChart . Nga Rodrigueze clic en Registrarse. Cherylene Founds anita y descargar porciones de brown expediente médico. 
Paramjit clic en el enlace de descarga del menú Resumen para descargar charles copia portátil de brown información médica . Si tiene [employer] , por favor visite la sección de preguntas frecuentes del sitio web MyChart . Recuerde, MyChart NO es que se utilizará para las necesidades urgentes. Para emergencias médicas , llame al 911 . Ahora disponible en brown iPhone y Android ! Por favor proporcione trey resumen de la documentación de cuidado a brown próximo proveedor. Your primary care clinician is listed as [employer] If you have any questions after today's visit, please call [phone].

## 2018-04-05 ENCOUNTER — TELEPHONE (OUTPATIENT)
Dept: INTERNAL MEDICINE CLINIC | Age: 53
End: 2018-04-05

## 2018-04-05 NOTE — TELEPHONE ENCOUNTER
I called patients daughter after verifying she is on permission to release information and confirmed that she is on fluoxetine for hot flashes.

## 2018-04-05 NOTE — TELEPHONE ENCOUNTER
Please call patients daughter Vera Bedolla at [phone]. She states her mother was given fluoxetine for hot flashes wants to make sure that is correct.

## 2018-04-08 LAB
CYTOLOGIST CVX/VAG CYTO: NORMAL
CYTOLOGY CVX/VAG DOC THIN PREP: NORMAL
DX ICD CODE: NORMAL
HPV I/H RISK 4 DNA CVX QL PROBE+SIG AMP: NEGATIVE
Lab: NORMAL
OTHER STN SPEC: NORMAL
PATH REPORT.FINAL DX SPEC: NORMAL
STAT OF ADQ CVX/VAG CYTO-IMP: NORMAL

## 2018-04-11 ENCOUNTER — HOSPITAL ENCOUNTER (OUTPATIENT)
Dept: MAMMOGRAPHY | Age: 53
Discharge: HOME | End: 2018-04-11
Attending: FAMILY MEDICINE
Payer: COMMERCIAL

## 2018-04-11 DIAGNOSIS — Z01.419 WELL WOMAN EXAM WITH ROUTINE GYNECOLOGICAL EXAM: ICD-10-CM

## 2018-04-11 DIAGNOSIS — Z12.39 SCREENING FOR BREAST CANCER: ICD-10-CM

## 2018-04-11 DIAGNOSIS — Z01.419 WELL WOMAN EXAM: ICD-10-CM

## 2018-04-11 PROCEDURE — 77067 SCR MAMMO BI INCL CAD: CPT

## 2019-05-08 ENCOUNTER — HOSPITAL ENCOUNTER (OUTPATIENT)
Dept: MAMMOGRAPHY | Age: 54
Discharge: HOME | End: 2019-05-08
Attending: FAMILY MEDICINE
Payer: COMMERCIAL

## 2019-05-08 DIAGNOSIS — Z12.39 BREAST SCREENING, UNSPECIFIED: ICD-10-CM

## 2019-05-08 PROCEDURE — 77067 SCR MAMMO BI INCL CAD: CPT

## 2019-05-22 ENCOUNTER — HOSPITAL ENCOUNTER (OUTPATIENT)
Dept: MAMMOGRAPHY | Age: 54
Discharge: HOME | End: 2019-05-22
Attending: FAMILY MEDICINE
Payer: COMMERCIAL

## 2019-05-22 DIAGNOSIS — R92.8 ABNORMAL MAMMOGRAM: ICD-10-CM

## 2019-05-22 PROCEDURE — 77065 DX MAMMO INCL CAD UNI: CPT

## 2019-05-22 PROCEDURE — 76642 ULTRASOUND BREAST LIMITED: CPT

## 2019-11-20 ENCOUNTER — HOSPITAL ENCOUNTER (OUTPATIENT)
Dept: MAMMOGRAPHY | Age: 54
Discharge: HOME | End: 2019-11-20
Payer: COMMERCIAL

## 2019-11-20 DIAGNOSIS — Z12.31 BREAST CANCER SCREENING BY MAMMOGRAM: ICD-10-CM

## 2019-11-20 DIAGNOSIS — R92.8 ABNORMAL MAMMOGRAM OF RIGHT BREAST: ICD-10-CM

## 2019-11-20 PROCEDURE — 77065 DX MAMMO INCL CAD UNI: CPT

## 2020-05-27 ENCOUNTER — HOSPITAL ENCOUNTER (OUTPATIENT)
Dept: MAMMOGRAPHY | Age: 55
Discharge: HOME | End: 2020-05-27
Attending: FAMILY MEDICINE
Payer: COMMERCIAL

## 2020-05-27 DIAGNOSIS — R92.8 ABNORMAL MAMMOGRAM: ICD-10-CM

## 2020-05-27 PROCEDURE — 77066 DX MAMMO INCL CAD BI: CPT

## 2020-10-16 ENCOUNTER — CLINICAL SUPPORT (OUTPATIENT)
Dept: DIABETES SERVICES | Age: 55
End: 2020-10-16
Payer: COMMERCIAL

## 2020-10-16 DIAGNOSIS — E11.9 TYPE 2 DIABETES MELLITUS WITHOUT COMPLICATION, WITHOUT LONG-TERM CURRENT USE OF INSULIN (HCC): Primary | ICD-10-CM

## 2020-10-16 PROCEDURE — G0108 DIAB MANAGE TRN  PER INDIV: HCPCS

## 2020-10-16 NOTE — PROGRESS NOTES
[facility] Program for Diabetes Health  Diabetes Self-Management Education   Pre-program Assessment    An [email or web address]  was used for the duration of this visit. Patient's primary language is Serbian, although she speaks [place] as well. Her preferred language for speaking and reading is [place] Her daughter Janny Ritter accompanied her today. Reason for Referral: Initial DSMES, New diagnosis of T2DM  Referral Source: Evi Donald *    Metric Patient responses (10/16/2020)   A1c  (Goal: 7%)   Recent value:   Lab Results   Component Value Date/Time    Hemoglobin A1c 5.6 02/27/2018 04:23 PM   Hemoglobin A1C                              6.9                           09/16/2020     Healthy Eating     24-hour Dietary Recall:    Breakfast: protein shake (Herbal Life), supplements, vegetables    Lunch: chicken/beef, 1-2 tortillas, vegetables, salad    Dinner: solid food or shake depending     Snacks: cut out snacks mostly, occasional almonds/walnuts or oranges    Beverages: water  Alcohol: occasional eric    Stage of change: Action    - She does not prefer desserts. - She has cut back on portions of carbs and tries not to snack.  - Reviewed  Serbian version of \"Where Do I Begin? \" by ADA. Being Active     Physical Activity Vital Sign:  How many days during the past week have you performed physical activity where your heart beats faster and your breathing is harder than normal for 30 minutes or more? 5-6 days    How many days in a typical week do you perform activity such as this?  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 Monitoring Do you monitor your blood sugar? No    How often do you monitor? Never    Do you know your last A1c measurement? No    Do you know the meaning of the A1c?  No    Stage of change: Preparation    - Showed patient examples of BG meters and testing supplies. She will reach out to her insurance company to determine brand coverage and will then request a Rx from her PCP. Taking Medications Medication Management:  Do you understand what your diabetes medications do? n/a    Can you afford your diabetes medications? n/a    How often do you miss doses of your diabetes medications? Not taking diabetes medication    Current dosing:     Anti-hyperglycemia Agents:    Patient is on no anti-hyperglycemia agents. She asked her PCP about metformin, but Dr. Anali Brown did not prescribe any medication at this time. Blood Pressure Management:  Key ACE/ARB Medications     Patient is on no ACE or ARB meds. Lipid Management:  Key Antihyperlipidemia Meds     The patient is on no antihyperlipidemia meds. Clot Prevention:  Key Anti-Platelet Anticoagulant Meds     The patient is on no antiplatelet meds or anticoagulants. Stage of change: Action     Healthy Coping Diabetes Skills, Confidence and Preparedness Index:  Unable to complete [facility] tool due to time constraints. Stage of change: Preparation     Reducing Risks Vaccines:  Influenza: Completed 10/8/20    Pneumococcal:   There is no immunization history on file for this patient. Patient has never had this vaccine. Hepatitis:   There is no immunization history on file for this patient. Patient is not sure about these vaccines.     Examinations:  Diabetic Foot and Eye Exam HM Status   Topic Date Due    Diabetic Foot Care  01/31/2018    Eye Exam  03/14/2019      Eye exam: Summer 2019    Dental exam: Completed 9/2020    Foot exam: DUE    Heart Protection:  BP Readings from Last 2 Encounters:   02/27/18 104/74   09/26/17 113/77        Lab Results   Component Value Date/Time    LDL, calculated 109 (H) 02/27/2018 04:23 PM        Kidney Protection:  Lab Results   Component Value Date/Time    Microalb/Creat ratio (ug/mg creat.) 35.0 (H) 01/31/2017 11:26 AM       Patient-reported diabetes complications:  none    Stage of change: Maintenance     Problem Solving Hypoglycemia Management:  What are signs and symptoms of hypoglycemia that you experience? n/a    How do you prevent hypoglycemia? n/a    How do you treat hypoglycemia? n/a    Hyperglycemia Management:  What are signs and symptoms of hyperglycemia that you experience? Extreme thirst, Fatigue, Blurred vision    How can you prevent hyperglycemia? Focus on carbohydrate counting/meal planning, Maintain a healthy weight, Engage in regular physically active    Sick Day Management:  What do you do differently on sick days? Pt reported being unaware of self-management on sick days    Pattern Management:  Do you notice blood glucose patterns when you look at the readings in your meter or logbook? n/a    How do you use the blood glucose readings from your meter or logbook? Pt reported being unaware of pattern management skills    Stage of change: Preparation     - Reviewed \"What is diabetes? \" video in Malian on YouTube with patient. Reviewed the difference between T1DM and T2DM. Note: Content derived from the American Association of Diabetes Educators' Diabetes Education Curriculum: A Guide to Successful Self-Management (2nd edition)         Diabetes Educator Recommendations:   - Address diabetes self-care behaviors through education and support using AADE7 Curriculum. Pt to attend weekly individual sessions on reducing risks, healthy eating, being active, monitoring, taking medications, healthy coping and problem solving.     - Patient intends to focus on these diabetes self-care practices: Reducing risks, Healthy eating, Monitoring, Physical activity, Taking medications, Healthy coping and Problem solving      - Pt to follow up with provider on the following: Discuss obtaining a glucometer and testing supplies.        Diabetes Self-Management Education Follow-up Visit: 10/20/20 @ 8:15 am to focus on meal planning    [facility] Public Health Emergency Adaptations for Telehealth:  Michelle Lacy is a 48 y.o. female being evaluated in person for 1:1 education. --Carolina Cononrs RN on 10/16/2020 at 1:05 PM    An electronic signature was used to authenticate this note.  I was in the office for the appointment and time spent: 60 minutes

## 2020-10-20 ENCOUNTER — CLINICAL SUPPORT (OUTPATIENT)
Dept: DIABETES SERVICES | Age: 55
End: 2020-10-20
Payer: COMMERCIAL

## 2020-10-20 DIAGNOSIS — E11.9 TYPE 2 DIABETES MELLITUS WITHOUT COMPLICATION, WITHOUT LONG-TERM CURRENT USE OF INSULIN (HCC): Primary | ICD-10-CM

## 2020-10-20 PROCEDURE — G0108 DIAB MANAGE TRN  PER INDIV: HCPCS

## 2020-10-20 NOTE — PROGRESS NOTES
[employer] Program for Diabetes Health  Diabetes Self-Management Education   Education and Goal Plan for Session #1    An [employer]  was used for the duration of this visit. Patient's primary language is Korean, although she speaks [place] as well. Her preferred language for speaking and reading is [place] AADE7 Self-Care Behaviors:  Behavior Education Completed (10/20/2020)   Healthy Eating   - Impact of food on blood glucose levels  - Food sources of carbohydrates  - Meal size and portions  - Reading food labels  - Balanced plate  - Meal and snack timing  - Carbohydrate counting   - Importance of a variety of foods  - Heart healthy fats  - Importance of not skipping meals  - Eating breakfast  - Controlling portions of carbohydrates  - Comparing food choices  - Meal consistency  - Patient is interested in beginning to count CHOs and measure portions. - Encouraged her to eat every 4-5 hours, as she sometimes skips meals.  - Encouraged her to limit meals to 45 g of CHO. - Provided her with Korean foldout on meal planning and carb counting. Being Active   - Effects of exercise on blood glucose  - Goals for exercise  - Encouraged patient to continue to walk 45-60 min per day. Monitoring     - ADA blood glucose targets  - Frequency of monitoring  - A1c interpretation  - Logging blood glucose results   - Patient has not obtained a glucose meter yet, but plans to contact her insurance company and obtain a Rx from her PCP. - Provided her with a handout in Korean that explains the benefits of checking BG, target ranges, and discussed when to check, at least 3-4 days per week fasting in the morning. Taking Medications Not addressed during this session. Healthy Coping Not addressed during this session.   - Patient is planning to visit her mother in [place] for a week and she is looking forward to see her.    Reducing Risks - Provided patient with handout on reducing risks in Monegasque  Not addressed during this session. Problem Solving - Hypoglycemia signs/symptoms  - Hyperglycemia signs/symptoms  - Provided patient with handout in Monegasque regarding s/s and treatment of hypoglycemia and hyperglycemia. Note: Content derived from the American Association of Diabetes Educators' Diabetes Education Curriculum: A Guide to Successful Self-Management (2nd edition)         Diabetes Educator Recommendations:     - Continue addressing diabetes self-care behaviors through education and support in AADE7 Curriculum individual sessions on reducing risks, healthy eating, being active, monitoring, taking medications, healthy coping and problem solving.     - Continue practicing knowledge and skills relating to healthy eating and monitoring to improve diabetes self-management. - Patient's Identified SMART Goal(s): - n/a     - Pt to follow up with provider on the following: Discuss ordering glucose meter and possibly metformin with PCP. Diabetes Self-Management Education Follow-up Visit: 11/23/20 @ 8:15 am with Santo Hooks RD, [facility] to follow up on meal planning and weight loss    [id number] Public Health Emergency Adaptations:    Roscoe Wetzel is a 48 y.o. female being evaluated for 1:1 education in person. --Al Flynn RN on 10/20/2020 at 9:43 AM    An electronic signature was used to authenticate this note.  I was in the office for the appointment and time spent: 60 minutes

## 2020-12-14 ENCOUNTER — CLINICAL SUPPORT (OUTPATIENT)
Dept: DIABETES SERVICES | Age: 55
End: 2020-12-14
Payer: COMMERCIAL

## 2020-12-14 DIAGNOSIS — E11.9 TYPE 2 DIABETES MELLITUS WITHOUT COMPLICATION, WITHOUT LONG-TERM CURRENT USE OF INSULIN (HCC): Primary | ICD-10-CM

## 2020-12-14 PROCEDURE — G0108 DIAB MANAGE TRN  PER INDIV: HCPCS | Performed by: DIETITIAN, REGISTERED

## 2020-12-14 NOTE — PROGRESS NOTES
[facility] Program for Diabetes Health  Diabetes Self-Management Education   Education and Goal Plan for Session #2    Pt was seen in-person today for her DM management education session and with the assistance of Wei  for communication in [address] [place] [address] AADE7 Self-Care Behaviors:  Behavior Education Completed (12/14/2020)   Healthy Eating   - Reading food labels  - Carbohydrate counting   - Importance of not skipping meals  - Eating breakfast  - Controlling portions of carbohydrates     Being Active   Not addressed during this session. Monitoring     - Frequency of monitoring  - Blood glucose schedule  - -obtaining testing supplies- recommended Reli On brand options; reviewed items and approximate costs to address concerns about her healthcare costs. Taking Medications - Medication review  - Medication schedule  - Encouraged pt to take her Metformin consistently and to not skip. Benefits of medication and how it helps the body were reviewed. Healthy Coping Not addressed during this session. Reducing Risks Not addressed during this session. Problem Solving - Need to cover sick days and diabetes preparedness at next visit. Note: Content derived from the American Association of Diabetes Educators' Diabetes Education Curriculum: A Guide to Successful Self-Management (2nd edition)         Diabetes Educator Recommendations:     - Continue addressing diabetes self-care behaviors through education and support in AADE7 Curriculum individual sessions on reducing risks, healthy eating, being active, monitoring, taking medications, healthy coping and problem solving.     - Continue practicing knowledge and skills relating to healthy eating, monitoring and medications to improve diabetes self-management. - Patient's Identified SMART Goal(s): - to obtain a glucometer to test BG levels. - Pt to follow up with provider on the following: nothing at his time.       Diabetes Self-Management Education Follow-up Visit: 1/11/20    --Jane BANKS on 12/14/2020 at 11:50 AM    An electronic signature was used to authenticate this note.  I was in the office for the appointment and time spent: 55 minutes

## 2021-01-11 ENCOUNTER — CLINICAL SUPPORT (OUTPATIENT)
Dept: DIABETES SERVICES | Age: 56
End: 2021-01-11
Payer: COMMERCIAL

## 2021-01-11 DIAGNOSIS — E11.9 TYPE 2 DIABETES MELLITUS WITHOUT COMPLICATION, WITHOUT LONG-TERM CURRENT USE OF INSULIN (HCC): Primary | ICD-10-CM

## 2021-01-11 PROCEDURE — G0108 DIAB MANAGE TRN  PER INDIV: HCPCS | Performed by: DIETITIAN, REGISTERED

## 2021-01-11 NOTE — PROGRESS NOTES
[facility] Program for Diabetes Health  Diabetes Self-Management Education   Education and Goal Plan for Session #Session 2 and Session 3   Pt was seen in-person today for her DM management and education session. We used [employer] , Anant Zheng. AADE7 Self-Care Behaviors:  Behavior Education Completed (1/11/2021)   Healthy Eating   - Controlling portions of carbohydrates  - Comparing food choices  - Meal consistency  Discussed holiday eating and managing portions. Shared how to manage on her upcoming birthday. Being Active   - Effects of exercise on blood glucose  - Physical activity and insulin  - Goals for exercise  - Types of exercise  40-45 minutes each day and may add 15 minutes on bicycle or elliptical machine. She shared the is also doing some resistance training. She demonstrated chair yoga with educator as option to include flexibility into her plan. Monitoring     - ADA blood glucose targets  - Frequency of monitoring  - Blood glucose schedule  - Monitoring blood glucose trends per meter   Discussed when to test BG to \"get the information\" you need or concerned about. Taking Medications Education delivered in previous session. Healthy Coping Not addressed during this session. Reducing Risks Education delivered in previous session. Problem Solving Not addressed during this session. Note: Content derived from the American Association of Diabetes Educators' Diabetes Education Curriculum: A Guide to Successful Self-Management (2nd edition)         Diabetes Educator Recommendations:  1) continue with testing BG and encouraged to move testing to times she is questioning what her BG values are doing. 2) encouraged to continue her activity - shared benefits and guidelines.      - Continue addressing diabetes self-care behaviors through education and support in AADE7 Curriculum individual sessions on reducing risks, healthy eating, being active, monitoring, taking medications, healthy coping and problem solving.     - Continue practicing knowledge and skills relating to healthy eating, monitoring and being active to improve diabetes self-management. - Patient's Identified SMART Goal(s): - not set at this visit. - Pt to follow up with provider on the following: nothing at this time. Diabetes Self-Management Education Follow-up Visit: 2/22/21    --Chicho BANKS on 1/11/2021 at 10:01 AM    An electronic signature was used to authenticate this note.  I was in the office for the appointment and time spent: 60 minutes

## 2021-02-22 ENCOUNTER — CLINICAL SUPPORT (OUTPATIENT)
Dept: DIABETES SERVICES | Age: 56
End: 2021-02-22
Payer: COMMERCIAL

## 2021-02-22 DIAGNOSIS — E11.9 TYPE 2 DIABETES MELLITUS WITHOUT COMPLICATION, WITHOUT LONG-TERM CURRENT USE OF INSULIN (HCC): Primary | ICD-10-CM

## 2021-02-22 PROCEDURE — G0108 DIAB MANAGE TRN  PER INDIV: HCPCS | Performed by: DIETITIAN, REGISTERED

## 2021-02-22 NOTE — PROGRESS NOTES
[facility] Program for Diabetes Health  Diabetes Self-Management Education & Support Program  Encounter note    Pt was seen today in person for a face to face visit with  services provided via video link. SUMMARY  Diabetes self-care management training was completed related to Reducing risks. The participant will return on 3.25.21 to continue DSMES related to Healthy coping and Problem solving. The participant did not identify SMART Goal(s) and will practice knowledge and skills related to reducing risks, healthy eating and monitoring and being active and medications to improve diabetes self-management. EVALUATION:  Pt was seen via  services with Donell Rodriguez for her visit today. RECOMMENDATIONS:  1) pt is due to complete dilated eye exam, comprehensive foot exam and daily foot care herself daily. 2) continue to test BG daily. 3) be as active as able within physical limits. Completed recent visit with provider over the last 3-4 weeks and share that she has not received the results of her lab work in the mail to share with educator today. SMART GOAL(S)  1. Obtain glucometer to begin testing daily. ACHIEVEMENT OF GOAL(S)  [] 0-24%     [] 25-49%     [] 50-74%     [x] %  2. ACHIEVEMENT OF GOAL(S)  [] 0-24%     [] 25-49%     [] 50-74%     [] %  3. ACHIEVEMENT OF GOAL(S)  [] 0-24%     [] 25-49%     [] 50-74%     [] %         DATE DSMES TOPIC EVALUATION     2/22/2021 HOW DO I STAY HEALTHY?   a. Prevention    Vaccinations   b.  Examinations    Eye     Dental   Foot   c. Diabetic complications' prevention    Heart health    Kidney Health    [facility]    Sleep health      The participant has a personal diabetes care record to keep abreast of diabetes health Yes    The participant needs to address dilated eye exam and foot exam.  We will review immunizations at f/u and with current financial burden for healthcare expenses we will discuss seeing dentist in future. Today we focused on discussions regarding her standards of care to discuss with PCP during her visit with them related to her diabetes. [facility]     2/22/2021 HOW DO I FIGURE OUT WHAT'S INFLUENCING MY BLOOD GLUCOSES?   a. Problem solving using SOAR    Set goals    Sort options    Arrive at a plan    Reaffirm plan   b. Common problems in diabetes self-care    Hypoglycemia    Hyperglycemia    Sick days   c. Pattern management          The participant has an action plan for    Hypoglycemia Yes   Hyperglycemia Yes   Sick days No   During disasters No    We need to review disaster preparedness and sick day management together at her next visit along with coping.  .     Sasha Vargas RD [facility] on 2/22/2021 at 1:23 PM      Time in appointment: 55 minutes

## 2021-04-09 ENCOUNTER — CLINICAL SUPPORT (OUTPATIENT)
Dept: DIABETES SERVICES | Age: 56
End: 2021-04-09
Payer: COMMERCIAL

## 2021-04-09 DIAGNOSIS — E11.9 TYPE 2 DIABETES MELLITUS WITHOUT COMPLICATION, WITHOUT LONG-TERM CURRENT USE OF INSULIN (HCC): Primary | ICD-10-CM

## 2021-04-09 PROCEDURE — G0108 DIAB MANAGE TRN  PER INDIV: HCPCS

## 2021-04-09 NOTE — PROGRESS NOTES
[facility] for Diabetes Health  Diabetes Self-Management Education & Support Program  Encounter note    SUMMARY  Diabetes self-care management training was completed related to Healthy coping. The participant will return on May 27, 2021 to continue DSMES related to Problem solving. The participant did not identify SMART Goal(s):  and will practice knowledge and skills related to healthy eating and monitoring and being active and medications to improve diabetes self-management. EVALUATION:  Pt shared that her most recent labs:  HgbA1c: 6.0% 1.28.21. pt was seen with , Melodie Jon today. RECOMMENDATIONS:  Continue to reach out to family for support. Continue with activity to help with stress (will expand on this at follow-up). SMART GOAL(S)  1. Obtain a glucometer to text BG levles. ACHIEVEMENT OF GOAL(S)  [] 0-24%     [] 25-49%     [] 50-74%     [] %  2. ACHIEVEMENT OF GOAL(S)  [] 0-24%     [] 25-49%     [] 50-74%     [] %  3. ACHIEVEMENT OF GOAL(S)  [] 0-24%     [] 25-49%     [] 50-74%     [] %     DATE DSMES TOPIC EVALUATION     4/9/2021 HOW DO I FIND SUPPORT TO TACKLE THIS CONDITION?   a. Normal responses to diabetes diagnosis or complication    Shock    Anger & resentment    Guilt/self-blame    Sadness & worry    Depression     Anxiety    Pregnancy   b. Constructive strategies to normal responses     Exploring feelings & attitudes    Motivation: Cost versus benefits analysis    Problem-solving: Chain analysis    Obtaining support: Communicating   i. Family & friends   ii. Health team   iii. Community   c. Stress    Symptoms    Managing stress    Fill your tool kit        The participant can identify people that support them in caring for their diabetes health:  Spouse, educator and PCP. The participant would like to pursue the following in keeping themselves healthy after completing the program:  Community events.     The participant needs to address - nothing at this time. We discussed sleep issues - she is concerned her evening walks make it more difficult for her to sleep. Discussed options to assist - small HS snack, move exercise earlier in day, continue with gutierrez and meditation/praying. Wally BANKS on 4/9/2021 at 10:18 AM    Pt was seen individually for a face to face visit today.    Time in appointment: 60 minutes

## 2021-06-07 ENCOUNTER — CLINICAL SUPPORT (OUTPATIENT)
Dept: DIABETES SERVICES | Age: 56
End: 2021-06-07
Payer: COMMERCIAL

## 2021-06-07 DIAGNOSIS — E11.9 TYPE 2 DIABETES MELLITUS WITHOUT COMPLICATION, WITHOUT LONG-TERM CURRENT USE OF INSULIN (HCC): Primary | ICD-10-CM

## 2021-06-07 PROCEDURE — G0108 DIAB MANAGE TRN  PER INDIV: HCPCS | Performed by: DIETITIAN, REGISTERED

## 2021-06-07 NOTE — PROGRESS NOTES
[facility] for Diabetes Health  Diabetes Self-Management Education & Support Program  Encounter note    SUMMARY  Diabetes self-care management training was completed related to Problem solving. The participant will return on August 2 to continue DSMES related to complete post program assessment. The participant did not identify SMART Goal(s):  and will practice knowledge and skills related to reducing risks, healthy eating and monitoring, being active and medications and healthy coping and problem solving to improve diabetes self-management. EVALUATION:  Eve Spencer reports no questions - participated in learning activities and discussions via  today. She shared that she is due to f/u with MD for arthritis in her knees due to her chronic pain. She continues to be as active as possible and has returned to work. Continues to watch her diet and check BG values. Reports no hx of hyper/hypoglycemia. RECOMMENDATIONS:  Continue to participating in self care behaviors. Next provider visit is scheduled for unknown. SMART GOAL(S)  1. To obtain glucometer and begin testing daily. ACHIEVEMENT OF GOAL(S)  [] 0-24%     [] 25-49%     [] 50-74%     [x] %  2. ACHIEVEMENT OF GOAL(S)  [] 0-24%     [] 25-49%     [] 50-74%     [] %  3. ACHIEVEMENT OF GOAL(S)  [] 0-24%     [] 25-49%     [] 50-74%     [] %       DATE DSMES TOPIC EVALUATION     6/7/2021 HOW DO I FIGURE OUT WHAT'S INFLUENCING MY BLOOD GLUCOSES?   a. Problem solving using SOAR    Set goals    Sort options    Arrive at a plan    Reaffirm plan   b. Common problems in diabetes self-care    Hypoglycemia    Hyperglycemia    Sick days   c. Pattern management   d.  Disaster preparedness plan        The participant has an action plan for    Hypoglycemia Yes   Hyperglycemia Yes   Sick days Yes   During disasters Yes       CLARA Mcmillan on 6/7/2021 at 12:32 PM    Pt was seen in-person today for her DM management education session and with the assistance of Wei  for communication in [address] [place] [address].      Time in appointment: 30 minutes

## 2021-06-11 ENCOUNTER — TRANSCRIBE ORDER (OUTPATIENT)
Dept: SCHEDULING | Age: 56
End: 2021-06-11

## 2021-06-11 DIAGNOSIS — Z12.31 SCREENING MAMMOGRAM FOR HIGH-RISK PATIENT: Primary | ICD-10-CM

## 2021-07-16 ENCOUNTER — HOSPITAL ENCOUNTER (OUTPATIENT)
Dept: MAMMOGRAPHY | Age: 56
Discharge: HOME | End: 2021-07-16
Attending: FAMILY MEDICINE
Payer: COMMERCIAL

## 2021-07-16 DIAGNOSIS — Z12.31 SCREENING MAMMOGRAM FOR HIGH-RISK PATIENT: ICD-10-CM

## 2021-07-16 PROCEDURE — 77067 SCR MAMMO BI INCL CAD: CPT

## 2021-08-20 ENCOUNTER — CLINICAL SUPPORT (OUTPATIENT)
Dept: DIABETES SERVICES | Age: 56
End: 2021-08-20
Payer: COMMERCIAL

## 2021-08-20 DIAGNOSIS — E11.9 DIABETES MELLITUS TYPE 2, DIET-CONTROLLED (HCC): Primary | ICD-10-CM

## 2021-08-20 PROCEDURE — G0108 DIAB MANAGE TRN  PER INDIV: HCPCS | Performed by: DIETITIAN, REGISTERED

## 2021-08-20 NOTE — PROGRESS NOTES
[facility] Program for Diabetes Health  Diabetes Self-Management Education & Support Program  Post-program Evaluation    EVALUATION @ COMPLETION OF THE PROGRAM    Courtney Salazar completed 7 hours of diabetes self-management education. The participant acquired new knowledge and demonstrated new skills during the program.     The participant worked on the following SMART GOAL(s) to improve their diabetes self-management: 1. To obtain glucometer and begin testing daily  ACHIEVEMENT OF GOAL(S)  [] 0-24%     [] 25-49%     [] 50-74%     [x] %  2. ACHIEVEMENT OF GOAL(S)  [] 0-24%     [] 25-49%     [] 50-74%     [] %  3. ACHIEVEMENT OF GOAL(S)  [] 0-24%     [] 25-49%     [] 50-74%     [] %    The participant's pre-program A1c was   Lab Results   Component Value Date/Time    Hemoglobin A1c 5.6 02/27/2018 04:23 PM   . The post-evaluation A1c is 6.0. The participant improved their Diabetes Skills, Confidence and Preparedness Index (scored out of 7): Total score: 6.2  Skills: 6.0  Confidence: 5.6  Preparedness: 7.0    FINAL RECOMMENDATIONS:  1) scheduled your dilated eye exam.  2) remind provider to complete comprehensive foot exam.      Next provider visit was completed last week. Jimbo Horner RD, [facility]  on 8/20/2021 at 9:17 AM    Metric Patient's responses (8/20/2021) Areas for improvement     Healthy Eating       The participant is using the Healthy Plate to control carbohydrate intake Yes    The participant reads food labels accurately Yes          Being Active       The participant performs physical activity where your heart beats faster and your breathing is harder than normal for 30 minutes or more?  7 days    In a typical week, the participant performs physical activity 7 days          Monitoring   The participant is monitoring their blood sugars?  Yes    The participant is monitoring 1x/day    Blood glucoses are ranging:   Breakfast: 105-106 mg/dL; 120-130 mg/dl with arthritis pain. The participant improved their A1c Yes    The participant understands the meaning of the A1c Yes          Taking Medications   The participant understands what their diabetes medications do n/a    The participant can afford your diabetes medications n/a    The participant does not miss doses of their diabetes medications Not taking diabetes medication          Healthy Coping     The participant feels supported by family, friends and others related to their diabetes self-care practices Yes, everyone at my house. The participant plans on utilizing the following community resources after completion of the program: Diabetes Online Community and ADA Nutrition        Reducing Risks   Vaccines:  Influenza: There is no immunization history on file for this patient. Pneumococcal:   There is no immunization history on file for this patient. Hepatitis:   There is no immunization history on file for this patient. Examinations:  Diabetic Foot and Eye Exam HM Status   Topic Date Due    Diabetic Foot Care  01/31/2018    Eye Exam  03/14/2019        Dental exam: last week  Eye exam: due  Foot exam: DUE    Heart Protection:  BP Readings from Last 2 Encounters:   02/27/18 104/74   09/26/17 113/77        Lab Results   Component Value Date/Time    LDL, calculated 109 (H) 02/27/2018 04:23 PM        Key Anti-Platelet Anticoagulant Meds     The patient is on no antiplatelet meds or anticoagulants.            Kidney Protection:  Lab Results   Component Value Date/Time    Microalb/Creat ratio (ug/mg creat.) 35.0 (H) 01/31/2017 11:26 AM      The participant would benefit from additional attention to:    Vaccines:  [] Influenza  [] Pneumococcal  [] Hepatitis    Examinations:  [] Dilated eye exam  [] Dental exam  [] Foot exam    Other:  [] Reviewing long-term complications     Problem Solving   Hypoglycemia Management:  The participant knows the signs and symptoms of hypoglycemia Shaking/trembling, Dizziness/light-headedness, Nervous feeling, Blurred vision    The participant knows how to prevent hypoglycemia? Consistent meals/snack times    The participant knows how to treat hypoglycemia? Rule of 15    Hyperglycemia Management:  The participant knows their signs and symptoms of hyperglycemia Extreme thirst, Fatigue, dry mouth    The participant knows how to prevent hyperglycemia? Focus on carbohydrate counting/meal planning, Engage in regular physically active, Monitor blood glucose    Sick Day Management:  The participant knows what to do differently on sick days? reviewed again with patient today. Pattern Management:  The participant can notice blood glucose patterns when looking at their blood glucose readings Yes           Note: Content derived from the American Association of Diabetes Educators' Diabetes Education Curriculum: A Guide to Successful Self-Management (3rd edition)      [address] Emergency Adaptations for Telehealth:  Alysa Elam was evaluated through a synchronous (real-time) audio-video encounter. The patient (or guardian if applicable) is aware that this is a billable service. Verbal consent to proceed has been obtained within the past 12 months. The visit was conducted pursuant to the emergency declaration under the [address] [place] [address] and the [employer] General Act. Patient identification was verified, and a caregiver was present when  appropriate. The patient was located in a state where the provider was credentialed to provide care.     Encounter date: 8/20/2021  Time in appointment: 35 minutes    Overall SCPI score: 6.2 Skills Score: 6.0  Low: Healthy Eating(Q1),Blood Sugar Monitoring(Q3),Reducing Risks(Q5),Problem Solving(Q6),Healthy Coping(Q7),Blood Sugar Monitoring(Q8),Reducing Risks(Q9) Confidence Score: 5.9  Low: Healthy Coping(Q2) Preparedness Score: 7.0  Low: Healthy Eating(Q1),Being Active(Q2),Healthy Coping(Q3),Blood Sugar Monitoring(Q6),Problem HYLKQOP(U7)  Healthy Eating Score: 6.5  Low: Skills(Q1),Confidence(Q4) Taking Medication Score: N/A Blood Sugar Monitoring Score: 6.3  Low: Skills(Q3),Skills(Q8),Confidence(Q6) Reducing Risks Score: 6.0  Low: Skills(Q5),Skills(Q9),Confidence(Q3)  Problem Solving Score: 6.3  Low: Skills(Q6),Confidence(Q7) Healthy Coping Score: 5.7  Low: Confidence(Q2) Being Active Score: 6.5  Low: Confidence(Q5)    Skills/Knowledge Questions  1. I know how to plan meals that have the best balance between carbohydrates, proteins and vegetables. 6  2. I know how my diabetes medications (pills, injectables and/or insulin) work in my body. 0  3. I know when to check my blood sugar if I want to see how my body responded to a meal. 6  4. I know when to check my blood sugars to determine if my medication or insulin doses are correct. 0  5. I know what to do to prevent a low blood sugar when I exercise (either before, during, or after). 6  6. When I am sick, I know what to do differently with my diabetes management. 6  7. I know how stress can affect my diabetes management. 6  8. When I look at my blood sugars over a given week, I can explain what my blood sugar pattern is. 6  9. I know what my target levels are for A1c, blood pressure and cholesterol. 6  Confidence Questions  1. I am confident that I can plan balanced meals and snacks. 7  2. I am confident that I can manage my stress. 4  3. I am confident that I can prevent a low blood sugar during or after exercise. 6  4. I am confident that the next time I eat out, I will be able to choose foods that best keep my blood sugars in target. 6  5. I am confident I can include exercise into my schedule. 6  6. I am confident that I can use my daily blood sugars to adjust my diet, my activity, and/or my insulin. 6  7.  When something out of my normal routine happens, I am confident that I can problem-solve and keep my diabetes on track. 6  Preparedness Questions  1. Within the next month, I will begin to eat more balanced meals and snacks. 8  2. Within the next month, I will choose an exercise activity and I will start fitting it into my schedule. 7  3. Within the next month, I will make a list of stress management options that work for me. 7  4. Within the next month, I will consistently plan ahead to prevent low blood sugars. 0  5. Within the next month, I will start adjusting my insulin doses on my own. 0  6. Within the next month, I will begin making changes to my diabetes management based on my daily blood sugars (eg - eating, activity and/or insulin). 8  7. Within the next month, I will begin making changes to my diabetes management to meet my overall goals (eg - eating, activity and/or insulin).  8

## 2022-03-18 PROBLEM — E11.9 DIABETES MELLITUS TYPE 2, DIET-CONTROLLED (HCC): Status: ACTIVE | Noted: 2018-04-03

## 2022-07-08 ENCOUNTER — TRANSCRIBE ORDER (OUTPATIENT)
Dept: SCHEDULING | Age: 57
End: 2022-07-08

## 2022-07-08 DIAGNOSIS — Z12.31 SCREENING MAMMOGRAM FOR HIGH-RISK PATIENT: Primary | ICD-10-CM

## 2022-07-19 ENCOUNTER — HOSPITAL ENCOUNTER (OUTPATIENT)
Dept: MAMMOGRAPHY | Age: 57
Discharge: HOME | End: 2022-07-19
Attending: FAMILY MEDICINE
Payer: COMMERCIAL

## 2022-07-19 DIAGNOSIS — Z12.31 SCREENING MAMMOGRAM FOR HIGH-RISK PATIENT: ICD-10-CM

## 2022-07-19 PROCEDURE — 77067 SCR MAMMO BI INCL CAD: CPT

## 2023-09-18 ENCOUNTER — HOSPITAL ENCOUNTER (OUTPATIENT)
Facility: HOSPITAL | Age: 58
Discharge: HOME | End: 2023-09-21
Payer: COMMERCIAL

## 2023-09-18 VITALS — BODY MASS INDEX: 27.94 KG/M2 | WEIGHT: 148 LBS | HEIGHT: 61 IN

## 2023-09-18 DIAGNOSIS — Z12.31 VISIT FOR SCREENING MAMMOGRAM: ICD-10-CM

## 2023-09-18 PROCEDURE — 77063 BREAST TOMOSYNTHESIS BI: CPT

## 2024-08-30 ENCOUNTER — APPOINTMENT (OUTPATIENT)
Facility: HOSPITAL | Age: 59
End: 2024-08-30
Payer: COMMERCIAL

## 2024-08-30 ENCOUNTER — HOSPITAL ENCOUNTER (EMERGENCY)
Facility: HOSPITAL | Age: 59
Discharge: HOME | End: 2024-08-30
Attending: STUDENT IN AN ORGANIZED HEALTH CARE EDUCATION/TRAINING PROGRAM
Payer: COMMERCIAL

## 2024-08-30 VITALS
RESPIRATION RATE: 16 BRPM | TEMPERATURE: 98.1 F | BODY MASS INDEX: 25.78 KG/M2 | WEIGHT: 136.47 LBS | DIASTOLIC BLOOD PRESSURE: 72 MMHG | OXYGEN SATURATION: 99 % | SYSTOLIC BLOOD PRESSURE: 108 MMHG | HEART RATE: 75 BPM

## 2024-08-30 DIAGNOSIS — K56.7 ILEUS (HCC): Primary | ICD-10-CM

## 2024-08-30 DIAGNOSIS — R10.84 GENERALIZED ABDOMINAL PAIN: ICD-10-CM

## 2024-08-30 LAB
ALBUMIN SERPL-MCNC: 3.9 G/DL (ref 3.5–5)
ALBUMIN/GLOB SERPL: 1.1 (ref 1.1–2.2)
ALP SERPL-CCNC: 74 U/L (ref 45–117)
ALT SERPL-CCNC: 32 U/L (ref 12–78)
AMORPH CRY URNS QL MICRO: ABNORMAL
ANION GAP SERPL CALC-SCNC: 9 MMOL/L (ref 5–15)
APPEARANCE UR: ABNORMAL
AST SERPL-CCNC: 20 U/L (ref 15–37)
BACTERIA URNS QL MICRO: NEGATIVE /HPF
BASOPHILS # BLD: 0 K/UL (ref 0–0.1)
BASOPHILS NFR BLD: 0 % (ref 0–1)
BILIRUB SERPL-MCNC: 0.6 MG/DL (ref 0.2–1)
BILIRUB UR QL: NEGATIVE
BUN SERPL-MCNC: 24 MG/DL (ref 6–20)
BUN/CREAT SERPL: 41 (ref 12–20)
CALCIUM SERPL-MCNC: 8.9 MG/DL (ref 8.5–10.1)
CAOX CRY URNS QL MICRO: ABNORMAL
CHLORIDE SERPL-SCNC: 105 MMOL/L (ref 97–108)
CO2 SERPL-SCNC: 27 MMOL/L (ref 21–32)
COLOR UR: ABNORMAL
CREAT SERPL-MCNC: 0.58 MG/DL (ref 0.55–1.02)
DIFFERENTIAL METHOD BLD: ABNORMAL
EOSINOPHIL # BLD: 0.1 K/UL (ref 0–0.4)
EOSINOPHIL NFR BLD: 1 % (ref 0–7)
EPITH CASTS URNS QL MICRO: ABNORMAL /LPF
ERYTHROCYTE [DISTWIDTH] IN BLOOD BY AUTOMATED COUNT: 13.2 % (ref 11.5–14.5)
GLOBULIN SER CALC-MCNC: 3.6 G/DL (ref 2–4)
GLUCOSE SERPL-MCNC: 119 MG/DL (ref 65–100)
GLUCOSE UR STRIP.AUTO-MCNC: NEGATIVE MG/DL
HCT VFR BLD AUTO: 39.2 % (ref 35–47)
HGB BLD-MCNC: 13 G/DL (ref 11.5–16)
HGB UR QL STRIP: NEGATIVE
IMM GRANULOCYTES # BLD AUTO: 0 K/UL (ref 0–0.04)
IMM GRANULOCYTES NFR BLD AUTO: 0 % (ref 0–0.5)
KETONES UR QL STRIP.AUTO: ABNORMAL MG/DL
LEUKOCYTE ESTERASE UR QL STRIP.AUTO: NEGATIVE
LIPASE SERPL-CCNC: 42 U/L (ref 13–75)
LYMPHOCYTES # BLD: 0.7 K/UL (ref 0.8–3.5)
LYMPHOCYTES NFR BLD: 9 % (ref 12–49)
MCH RBC QN AUTO: 29.3 PG (ref 26–34)
MCHC RBC AUTO-ENTMCNC: 33.2 G/DL (ref 30–36.5)
MCV RBC AUTO: 88.5 FL (ref 80–99)
MONOCYTES # BLD: 0.3 K/UL (ref 0–1)
MONOCYTES NFR BLD: 4 % (ref 5–13)
NEUTS SEG # BLD: 6.5 K/UL (ref 1.8–8)
NEUTS SEG NFR BLD: 86 % (ref 32–75)
NITRITE UR QL STRIP.AUTO: NEGATIVE
NRBC # BLD: 0 K/UL (ref 0–0.01)
NRBC BLD-RTO: 0 PER 100 WBC
PH UR STRIP: 5.5 (ref 5–8)
PLATELET # BLD AUTO: 222 K/UL (ref 150–400)
PMV BLD AUTO: 10.2 FL (ref 8.9–12.9)
POTASSIUM SERPL-SCNC: 3.6 MMOL/L (ref 3.5–5.1)
PROT SERPL-MCNC: 7.5 G/DL (ref 6.4–8.2)
PROT UR STRIP-MCNC: NEGATIVE MG/DL
RBC # BLD AUTO: 4.43 M/UL (ref 3.8–5.2)
RBC #/AREA URNS HPF: ABNORMAL /HPF (ref 0–5)
RBC MORPH BLD: ABNORMAL
SODIUM SERPL-SCNC: 141 MMOL/L (ref 136–145)
SP GR UR REFRACTOMETRY: 1.03 (ref 1–1.03)
UROBILINOGEN UR QL STRIP.AUTO: 0.2 EU/DL (ref 0.2–1)
WBC # BLD AUTO: 7.6 K/UL (ref 3.6–11)
WBC URNS QL MICRO: ABNORMAL /HPF (ref 0–4)

## 2024-08-30 PROCEDURE — 6360000004 HC RX CONTRAST MEDICATION: Performed by: NURSE PRACTITIONER

## 2024-08-30 PROCEDURE — 81001 URINALYSIS AUTO W/SCOPE: CPT

## 2024-08-30 PROCEDURE — 83690 ASSAY OF LIPASE: CPT

## 2024-08-30 PROCEDURE — 80053 COMPREHEN METABOLIC PANEL: CPT

## 2024-08-30 PROCEDURE — 85025 COMPLETE CBC W/AUTO DIFF WBC: CPT

## 2024-08-30 PROCEDURE — 74177 CT ABD & PELVIS W/CONTRAST: CPT

## 2024-08-30 PROCEDURE — 36415 COLL VENOUS BLD VENIPUNCTURE: CPT

## 2024-08-30 PROCEDURE — 99285 EMERGENCY DEPT VISIT HI MDM: CPT

## 2024-08-30 RX ORDER — SEMAGLUTIDE 2.68 MG/ML
INJECTION, SOLUTION SUBCUTANEOUS
COMMUNITY

## 2024-08-30 RX ORDER — IOPAMIDOL 755 MG/ML
100 INJECTION, SOLUTION INTRAVASCULAR
Status: COMPLETED | OUTPATIENT
Start: 2024-08-30 | End: 2024-08-30

## 2024-08-30 RX ORDER — POLYETHYLENE GLYCOL 3350 17 G/17G
17 POWDER, FOR SOLUTION ORAL DAILY
Qty: 510 G | Refills: 0 | Status: SHIPPED | OUTPATIENT
Start: 2024-08-30 | End: 2024-09-29

## 2024-08-30 RX ADMIN — IOPAMIDOL 100 ML: 755 INJECTION, SOLUTION INTRAVENOUS at 17:55

## 2024-08-30 ASSESSMENT — LIFESTYLE VARIABLES
HOW OFTEN DO YOU HAVE A DRINK CONTAINING ALCOHOL: NEVER
HOW MANY STANDARD DRINKS CONTAINING ALCOHOL DO YOU HAVE ON A TYPICAL DAY: PATIENT DOES NOT DRINK

## 2024-08-30 ASSESSMENT — PAIN - FUNCTIONAL ASSESSMENT: PAIN_FUNCTIONAL_ASSESSMENT: NONE - DENIES PAIN

## 2024-08-30 NOTE — ED PROVIDER NOTES
[facility]  EMERGENCY DEPARTMENT ENCOUNTER      Pt Name: Basilia Meza  MRN: [id number]  Birthdate 1965  Date of evaluation: 2024  Provider: SUE Mcfarland NP      HISTORY OF PRESENT ILLNESS      HPI        Nursing Notes were reviewed.    REVIEW OF SYSTEMS         Review of Systems        PAST MEDICAL HISTORY     Past Medical History:   Diagnosis Date    Degenerative arthritis of knee, bilateral     seen by ortho and injection    Diabetes mellitus (HCC)     H/O mammogram     normal    Menopause     LMP-3-4 years ago?    Pap smear for cervical cancer screening 2013    normal         SURGICAL HISTORY       Past Surgical History:   Procedure Laterality Date     SECTION      x2 (1st and 3rd daughter)    ORTHOPEDIC SURGERY      right knee         CURRENT MEDICATIONS       Previous Medications    METFORMIN (GLUCOPHAGE) 500 MG TABLET    Take 1 tablet by mouth daily (with breakfast)    OZEMPIC, 2 MG/DOSE, 8 MG/3ML SOPN SC INJECTION    INJECT 2 MG SUBCUTANEOUSLY ONCE A WEEK    PAROXETINE (PAXIL) 40 MG TABLET    Take 1 tablet by mouth daily       ALLERGIES     Patient has no known allergies.    FAMILY HISTORY       Family History   Problem Relation Age of Onset    Diabetes Mother     No Known Problems Father     Diabetes Sister     Breast Cancer Sister 59    No Known Problems Sister     No Known Problems Sister     No Known Problems Sister     No Known Problems Sister     No Known Problems Sister     No Known Problems Sister     No Known Problems Sister     No Known Problems Brother     No Known Problems Brother           SOCIAL HISTORY       Social History     Socioeconomic History    Marital status:      Spouse name: None    Number of children: None    Years of education: None    Highest education level: None   Tobacco Use    Smoking status: Never    Smokeless tobacco: Never   Substance and Sexual Activity    Alcohol use: No    Drug use: No   Social History Narrative  were addressed. Pt agrees to F/U as instructed and agrees to return to ED upon further deterioration. Pt is ready to go home.  SUE Mcfarland NP      (Please note that portions of this note were completed with a voice recognition program.  Efforts were made to edit the dictations but occasionally words are mis-transcribed.)             Thought Content: Thought content normal.         Judgment: Judgment normal.             EMERGENCY DEPARTMENT COURSE and DIFFERENTIAL DIAGNOSIS/MDM:   Vitals:    Vitals:    08/30/24 1702   BP: 121/67   Pulse: 75   Resp: 16   Temp: 98.1 °F (36.7 °C)   TempSrc: Tympanic   SpO2: 99%   Weight: 61.9 kg (136 lb 7.4 oz)         Medical Decision Making  Differential diagnosis includes diverticulitis, appendicitis, urinary tract infection and others.     The patient presents with complaints of mid abdominal pain that began around 1300 today. She describes the pain as pulsating. In addition to the abdominal pain, she experienced a few episodes of vomiting this afternoon. Both the pain and nausea have since resolved. The patient denies experiencing diarrhea, urinary symptoms, or any other associated symptoms including fever or chills, chest pain or shortness of breath. Has not taken any medications prior to arrival for her symptoms. There are no further complaints.    After physical examination, imaging and labs obtained.  CBC is unremarkable.  Lipase within normal limits.  CMP is unremarkable.  Urinalysis with negative bacteria but does appear to have +3 crystals and +1 calcium oxalate.  CT of the abdomen and pelvis does show mild dilation of several distal small bowel loops.  This is a questionable ileus. She was offered admission to the hospital however declined. Patient is able to tolerate p.o. in the emergency room without any nausea, vomiting, abdominal pain.  I will discharge patient home and have her follow-up with PCP as well as GI as an outpatient. Return to the emergency room with worsening symptoms. Discussed with Dr. Munoz who is in agreement with plan of care.  Patient is in agreement with plan of care.    Any available vitals, labs, images, nursing notes, medications, allergies, PMH, PSH and/or previous records in the chart were reviewed. All of these were considered in the medical decision making process. I

## 2024-08-30 NOTE — DISCHARGE INSTRUCTIONS
PLEASE RETURN TO THE EMERGENCY ROOM WITH ANY WORSENING SYMPTOMS INCLUDING WORSENING ABDOMINAL PAIN, NAUSEA OR VOMITING.

## 2024-08-30 NOTE — ED TRIAGE NOTES
Patient arrives with mid abdominal pain since 1300 today. Describes the pain as pulsating, and she had a few episodes of emesis this afternoon. The pain and nausea have resolved.  Denies diarrhea, urinary symptoms, or other symptoms.

## 2024-09-16 ASSESSMENT — ENCOUNTER SYMPTOMS
COUGH: 0
EYE REDNESS: 0
SINUS PRESSURE: 0
SINUS PAIN: 0
SHORTNESS OF BREATH: 0
COLOR CHANGE: 0
EYE PAIN: 0
NAUSEA: 1
VOMITING: 1
ABDOMINAL PAIN: 1
ABDOMINAL DISTENTION: 0

## 2024-09-19 ENCOUNTER — HOSPITAL ENCOUNTER (OUTPATIENT)
Facility: HOSPITAL | Age: 59
Discharge: HOME | End: 2024-09-22
Payer: COMMERCIAL

## 2024-09-19 VITALS — WEIGHT: 126 LBS | HEIGHT: 61 IN | BODY MASS INDEX: 23.79 KG/M2

## 2024-09-19 DIAGNOSIS — Z12.31 VISIT FOR SCREENING MAMMOGRAM: ICD-10-CM

## 2024-09-19 PROCEDURE — 77063 BREAST TOMOSYNTHESIS BI: CPT

## 2025-08-28 ENCOUNTER — TRANSCRIBE ORDERS (OUTPATIENT)
Facility: HOSPITAL | Age: 60
End: 2025-08-28

## 2025-08-28 DIAGNOSIS — Z12.31 VISIT FOR SCREENING MAMMOGRAM: Primary | ICD-10-CM
